# Patient Record
Sex: FEMALE | Race: WHITE | HISPANIC OR LATINO
[De-identification: names, ages, dates, MRNs, and addresses within clinical notes are randomized per-mention and may not be internally consistent; named-entity substitution may affect disease eponyms.]

---

## 2017-07-30 ENCOUNTER — FORM ENCOUNTER (OUTPATIENT)
Age: 52
End: 2017-07-30

## 2017-07-31 ENCOUNTER — OUTPATIENT (OUTPATIENT)
Dept: OUTPATIENT SERVICES | Facility: HOSPITAL | Age: 52
LOS: 1 days | End: 2017-07-31
Payer: COMMERCIAL

## 2017-07-31 ENCOUNTER — APPOINTMENT (OUTPATIENT)
Dept: ORTHOPEDIC SURGERY | Facility: CLINIC | Age: 52
End: 2017-07-31
Payer: COMMERCIAL

## 2017-07-31 DIAGNOSIS — Z86.2 PERSONAL HISTORY OF DISEASES OF THE BLOOD AND BLOOD-FORMING ORGANS AND CERTAIN DISORDERS INVOLVING THE IMMUNE MECHANISM: ICD-10-CM

## 2017-07-31 DIAGNOSIS — M25.561 PAIN IN RIGHT KNEE: ICD-10-CM

## 2017-07-31 DIAGNOSIS — M25.562 PAIN IN LEFT KNEE: ICD-10-CM

## 2017-07-31 PROCEDURE — 73630 X-RAY EXAM OF FOOT: CPT

## 2017-07-31 PROCEDURE — 73630 X-RAY EXAM OF FOOT: CPT | Mod: 26,50

## 2017-07-31 PROCEDURE — 73600 X-RAY EXAM OF ANKLE: CPT

## 2017-07-31 PROCEDURE — 73600 X-RAY EXAM OF ANKLE: CPT | Mod: 26,50

## 2017-07-31 PROCEDURE — 99203 OFFICE O/P NEW LOW 30 MIN: CPT

## 2017-09-13 ENCOUNTER — APPOINTMENT (OUTPATIENT)
Dept: ORTHOPEDIC SURGERY | Facility: CLINIC | Age: 52
End: 2017-09-13
Payer: COMMERCIAL

## 2017-09-13 DIAGNOSIS — M76.62 ACHILLES TENDINITIS, RIGHT LEG: ICD-10-CM

## 2017-09-13 DIAGNOSIS — M76.61 ACHILLES TENDINITIS, RIGHT LEG: ICD-10-CM

## 2017-09-13 PROCEDURE — 99214 OFFICE O/P EST MOD 30 MIN: CPT

## 2017-10-06 ENCOUNTER — APPOINTMENT (OUTPATIENT)
Dept: ORTHOPEDIC SURGERY | Facility: CLINIC | Age: 52
End: 2017-10-06

## 2018-03-29 ENCOUNTER — APPOINTMENT (OUTPATIENT)
Dept: ENDOCRINOLOGY | Facility: CLINIC | Age: 53
End: 2018-03-29
Payer: COMMERCIAL

## 2018-03-29 VITALS
WEIGHT: 232 LBS | DIASTOLIC BLOOD PRESSURE: 79 MMHG | SYSTOLIC BLOOD PRESSURE: 121 MMHG | HEIGHT: 64 IN | HEART RATE: 90 BPM | BODY MASS INDEX: 39.61 KG/M2

## 2018-03-29 PROCEDURE — 99205 OFFICE O/P NEW HI 60 MIN: CPT

## 2018-03-29 PROCEDURE — 97802 MEDICAL NUTRITION INDIV IN: CPT

## 2018-04-20 LAB
25(OH)D3 SERPL-MCNC: 19.5 NG/ML
ACTH-ESO: 11 PG/ML
ALBUMIN SERPL ELPH-MCNC: 4.2 G/DL
ALP BLD-CCNC: 113 U/L
ALT SERPL-CCNC: 26 U/L
ANION GAP SERPL CALC-SCNC: 13 MMOL/L
AST SERPL-CCNC: 26 U/L
BILIRUB SERPL-MCNC: 0.2 MG/DL
BUN SERPL-MCNC: 16 MG/DL
CALCIUM SERPL-MCNC: 8.9 MG/DL
CHLORIDE SERPL-SCNC: 105 MMOL/L
CHOLEST SERPL-MCNC: 204 MG/DL
CHOLEST/HDLC SERPL: 3 RATIO
CO2 SERPL-SCNC: 25 MMOL/L
CORTIS SERPL-MCNC: 3.4 UG/DL
CREAT SERPL-MCNC: 0.66 MG/DL
GLUCOSE SERPL-MCNC: 92 MG/DL
HBA1C MFR BLD HPLC: 6 %
HDLC SERPL-MCNC: 68 MG/DL
LDLC SERPL CALC-MCNC: 95 MG/DL
POTASSIUM SERPL-SCNC: 4.1 MMOL/L
PROT SERPL-MCNC: 6.9 G/DL
SODIUM SERPL-SCNC: 143 MMOL/L
TRIGL SERPL-MCNC: 205 MG/DL
TSH SERPL-ACNC: 0.95 UIU/ML

## 2018-04-23 ENCOUNTER — APPOINTMENT (OUTPATIENT)
Dept: ENDOCRINOLOGY | Facility: CLINIC | Age: 53
End: 2018-04-23
Payer: COMMERCIAL

## 2018-04-23 VITALS
SYSTOLIC BLOOD PRESSURE: 142 MMHG | BODY MASS INDEX: 39.09 KG/M2 | HEART RATE: 85 BPM | DIASTOLIC BLOOD PRESSURE: 87 MMHG | WEIGHT: 229 LBS | HEIGHT: 64 IN

## 2018-04-23 DIAGNOSIS — E55.9 VITAMIN D DEFICIENCY, UNSPECIFIED: ICD-10-CM

## 2018-04-23 PROCEDURE — 99214 OFFICE O/P EST MOD 30 MIN: CPT

## 2018-04-25 ENCOUNTER — OTHER (OUTPATIENT)
Age: 53
End: 2018-04-25

## 2018-04-27 ENCOUNTER — OTHER (OUTPATIENT)
Age: 53
End: 2018-04-27

## 2018-05-01 ENCOUNTER — OUTPATIENT (OUTPATIENT)
Dept: OUTPATIENT SERVICES | Facility: HOSPITAL | Age: 53
LOS: 1 days | End: 2018-05-01
Payer: COMMERCIAL

## 2018-05-01 PROCEDURE — 71046 X-RAY EXAM CHEST 2 VIEWS: CPT | Mod: 26

## 2018-05-01 PROCEDURE — 71046 X-RAY EXAM CHEST 2 VIEWS: CPT

## 2018-05-07 ENCOUNTER — APPOINTMENT (OUTPATIENT)
Dept: ENDOCRINOLOGY | Facility: CLINIC | Age: 53
End: 2018-05-07
Payer: COMMERCIAL

## 2018-05-07 VITALS — BODY MASS INDEX: 39.14 KG/M2 | WEIGHT: 228 LBS

## 2018-05-07 PROCEDURE — 97803 MED NUTRITION INDIV SUBSEQ: CPT

## 2018-07-23 ENCOUNTER — APPOINTMENT (OUTPATIENT)
Dept: ENDOCRINOLOGY | Facility: CLINIC | Age: 53
End: 2018-07-23
Payer: COMMERCIAL

## 2018-07-23 VITALS
SYSTOLIC BLOOD PRESSURE: 118 MMHG | HEIGHT: 64 IN | WEIGHT: 220 LBS | DIASTOLIC BLOOD PRESSURE: 78 MMHG | HEART RATE: 84 BPM | BODY MASS INDEX: 37.56 KG/M2

## 2018-07-23 PROCEDURE — 99213 OFFICE O/P EST LOW 20 MIN: CPT

## 2018-07-26 LAB
25(OH)D3 SERPL-MCNC: 30.4 NG/ML
ALBUMIN SERPL ELPH-MCNC: 4.8 G/DL
ALP BLD-CCNC: 109 U/L
ALT SERPL-CCNC: 23 U/L
ANION GAP SERPL CALC-SCNC: 17 MMOL/L
AST SERPL-CCNC: 23 U/L
BILIRUB SERPL-MCNC: 0.4 MG/DL
BUN SERPL-MCNC: 9 MG/DL
CALCIUM SERPL-MCNC: 9.1 MG/DL
CHLORIDE SERPL-SCNC: 99 MMOL/L
CHOLEST SERPL-MCNC: 188 MG/DL
CHOLEST/HDLC SERPL: 3.2 RATIO
CO2 SERPL-SCNC: 24 MMOL/L
CREAT SERPL-MCNC: 0.74 MG/DL
GLUCOSE SERPL-MCNC: 104 MG/DL
HBA1C MFR BLD HPLC: 6 %
HDLC SERPL-MCNC: 59 MG/DL
LDLC SERPL CALC-MCNC: 117 MG/DL
POTASSIUM SERPL-SCNC: 4.4 MMOL/L
PROT SERPL-MCNC: 7.4 G/DL
SODIUM SERPL-SCNC: 140 MMOL/L
TRIGL SERPL-MCNC: 58 MG/DL
TSH SERPL-ACNC: 0.75 UIU/ML

## 2018-08-08 ENCOUNTER — APPOINTMENT (OUTPATIENT)
Dept: ENDOCRINOLOGY | Facility: CLINIC | Age: 53
End: 2018-08-08

## 2019-06-28 ENCOUNTER — APPOINTMENT (OUTPATIENT)
Dept: ORTHOPEDIC SURGERY | Facility: CLINIC | Age: 54
End: 2019-06-28
Payer: COMMERCIAL

## 2019-06-28 VITALS
WEIGHT: 215 LBS | DIASTOLIC BLOOD PRESSURE: 80 MMHG | BODY MASS INDEX: 36.7 KG/M2 | HEART RATE: 83 BPM | SYSTOLIC BLOOD PRESSURE: 124 MMHG | OXYGEN SATURATION: 97 % | HEIGHT: 64 IN

## 2019-06-28 DIAGNOSIS — M17.0 BILATERAL PRIMARY OSTEOARTHRITIS OF KNEE: ICD-10-CM

## 2019-06-28 PROCEDURE — 99213 OFFICE O/P EST LOW 20 MIN: CPT

## 2019-06-28 RX ORDER — METFORMIN HYDROCHLORIDE 1000 MG/1
1000 TABLET, FILM COATED, EXTENDED RELEASE ORAL
Qty: 180 | Refills: 2 | Status: DISCONTINUED | COMMUNITY
Start: 2018-04-23 | End: 2019-06-28

## 2019-06-28 RX ORDER — ERGOCALCIFEROL 1.25 MG/1
1.25 MG CAPSULE, LIQUID FILLED ORAL
Qty: 12 | Refills: 1 | Status: DISCONTINUED | COMMUNITY
Start: 2018-04-25 | End: 2019-06-28

## 2019-06-28 RX ORDER — METFORMIN HYDROCHLORIDE 1000 MG/1
1000 TABLET, COATED ORAL
Qty: 180 | Refills: 3 | Status: DISCONTINUED | COMMUNITY
Start: 2018-04-25 | End: 2019-06-28

## 2019-06-28 RX ORDER — METFORMIN HYDROCHLORIDE 1000 MG/1
1000 TABLET, COATED ORAL
Qty: 60 | Refills: 5 | Status: DISCONTINUED | COMMUNITY
Start: 2018-04-27 | End: 2019-06-28

## 2019-06-28 RX ORDER — IBUPROFEN 800 MG/1
TABLET, FILM COATED ORAL
Refills: 0 | Status: DISCONTINUED | COMMUNITY
End: 2019-06-28

## 2019-06-28 RX ORDER — ERGOCALCIFEROL 1.25 MG/1
1.25 MG CAPSULE, LIQUID FILLED ORAL
Qty: 12 | Refills: 3 | Status: DISCONTINUED | COMMUNITY
Start: 2018-04-23 | End: 2019-06-28

## 2019-06-28 NOTE — HISTORY OF PRESENT ILLNESS
[Worsening] : worsening [___ yrs] : [unfilled] year(s) ago [Constant] : ~He/She~ states the symptoms seem to be constant [Bending] : worsened by bending [Walking] : worsened by walking [None] : No relieving factors are noted [de-identified] : Patient presents for evaluation of bilateral knee pain. States pain is worse after walking for prolonged periods and going up and down stairs. She reports a constant ache. She denies limping and ambulates unassisted. She is working on weight loss and is working with a . She had a steroid injection several years ago with temporary relief.  [NSAIDs] : not relieved by nonsteroidal anti-inflammatory drugs [Acetaminophen] : not relieved by acetaminophen [Physical Therapy] : not relieved by physical therapy

## 2019-06-28 NOTE — PHYSICAL EXAM
[de-identified] : Constitutional: Well appearing.  No acute distress.\par Mental Status: Alert & oriented to person, place and time. Normal affect.\par Pulmonary: No respiratory distress. Normal chest excursion.\par Abdomen: Soft and not distended.\par Gait: Normal.\par Ambulatory assist devices: None.\par \par Cervical spine: Skin intact. No visible deformity.  Painless active ROM without evident restriction.\par Bilateral upper extremities: Skin intact. No deformity. Painless active ROM without evident restriction.\par Thoracolumbar spine: No deformity. No tenderness. No radicular pain on passive straight leg raise bilaterally.\par Pelvis: No pelvic obliquity. No tenderness.\par Leg lengths: Equal.\par \par Bilateral Hips: No swelling or deformity. No focal tenderness. Painless and unrestricted range of motion.  No crepitation. Hip flexor and abductor power 5/5.\par \par Right Knee:\par Skin intact.\par No surgical scars.\par No erythema or ecchymosis.\par No swelling or effusion.\par No deformity.\par Medial > lateral joint line tenderness.\par Painless ROM from full extension to 120 degrees of flexion.\par Central patellar tracking.\par No crepitation.\par No instability.\par Quadriceps power 5/5.\par \par Left Knee:\par Skin intact.\par No surgical scars.\par No erythema or ecchymosis.\par No swelling or effusion.\par No deformity.\par Medial and lateral joint line tenderness.\par Painless ROM from full extension to 125 degrees of flexion.\par Central patellar tracking.\par No crepitation.\par No instability.\par Quadriceps power 5/5.\par \par Neurological: Intact distal crude touch sensation. Normal distal motor power. Symmetric knee jerk and ankle jerk reflexes bilaterally.\par Cardiovascular: Palpable dorsalis pedis and posterior tibialis pulses. Brisk capillary refill. No peripheral edema.\par Lymphatics:  No peripheral adenopathy appreciated.\par \par  [de-identified] : X-rays scanned into orthopacs demonstrate bilateral knees with moderate joint space narrowing and DJD

## 2019-06-28 NOTE — ADDENDUM
[FreeTextEntry1] : Dr. Pinto was physically present during the key portions the encounter, provided assistance as needed, and formulated the assessment and plan as documented above.\par \par

## 2019-06-28 NOTE — DISCUSSION/SUMMARY
[de-identified] : Diagnosis, prognosis and treatment options discussed. Conservative management including activity modification, therapeutic exercise with PT, topical and oral antiinflammatories, steroid and HA injections discussed. Patient will begin PT and may take Meloxicam prn. She may call if she wishes to pursue injections.

## 2019-07-23 ENCOUNTER — APPOINTMENT (OUTPATIENT)
Dept: ENDOCRINOLOGY | Facility: CLINIC | Age: 54
End: 2019-07-23
Payer: COMMERCIAL

## 2019-07-23 VITALS
WEIGHT: 226 LBS | BODY MASS INDEX: 38.79 KG/M2 | HEART RATE: 73 BPM | SYSTOLIC BLOOD PRESSURE: 141 MMHG | DIASTOLIC BLOOD PRESSURE: 85 MMHG

## 2019-07-23 DIAGNOSIS — R73.03 PREDIABETES.: ICD-10-CM

## 2019-07-23 PROCEDURE — 99214 OFFICE O/P EST MOD 30 MIN: CPT

## 2019-07-26 NOTE — PHYSICAL EXAM
[Alert] : alert [Normal Sclera/Conjunctiva] : normal sclera/conjunctiva [No Respiratory Distress] : no respiratory distress [Thyroid Not Enlarged] : the thyroid was not enlarged [No Thyroid Nodules] : there were no palpable thyroid nodules [Normal Rate] : heart rate was normal  [Pedal Pulses Normal] : the pedal pulses are present [Not Distended] : not distended [Normal Bowel Sounds] : normal bowel sounds [No Stigmata of Cushings Syndrome] : no stigmata of cushings syndrome [Spine Straight] : spine straight [Normal Gait] : normal gait [No Rash] : no rash [Normal Reflexes] : deep tendon reflexes were 2+ and symmetric [Oriented x3] : oriented to person, place, and time [Normal Outer Ear/Nose] : the ears and nose were normal in appearance [Clear to Auscultation] : lungs were clear to auscultation bilaterally [Normal S1, S2] : normal S1 and S2 [No Edema] : there was no peripheral edema [Acanthosis Nigricans] : no acanthosis nigricans [de-identified] : no tenderness  [de-identified] : varicose veins

## 2019-07-26 NOTE — HISTORY OF PRESENT ILLNESS
[FreeTextEntry1] : 11/2015 glucose 112\par 3/29/18: A1c 6%, Total cholesterol 204, a 25 OH vitamin D 19.5, TSH and cortisol, both normal \par 7/23/18: A1c 6.0%, TSH 0.75, s. creat 0.74, LDL-c 117, Vit D 25-OH 30.4\par \par 53 years old female pt, /85, BMI 38.79, with Hx of pre- DM and obesity.\par Other Significant PMHx: bilateral plantar fasciitis and bilateral knee arthroscopic surgery\par Other PMHx: Vit D insufficiency \par SHx: works as an independent , rare etOH use\par Physically active \par Saw PCP within the past 12 months\par \par 3/29/18\par Presents today complaining of rapid weight gain of > 20 pounds over the past 8 months, without lifestyle and diet changes. \par \par 7/23/18\par Patient is focused with her, weight management she is attending a program to lose weight, she has lost 12 pounds\par She also seen RD [facility]\par She is feeling great no complaints\par \par 7/23/19\par Today pt presents for endocrine f/u, feeling well with no major physical complaints. Pt notes she is not taking any new medications and saw her PCP within the past 12 months for a regular f/u. \par Pt gained 6lbs since her last visit on 7/23/19.\par \par Current Medications: Probiotic for the past couple of months

## 2019-07-26 NOTE — ASSESSMENT
[FreeTextEntry1] : 54 y/o F pt with:\par 1. Hx of pre-DM, with continuous A1c of 6%:\par Pt is asymptomatic, and is at increased risk to develop DM along with long term complications associated with DM.\par \par 2. Hx of obesity, BMI 38.79 along with elevation of LDL-c: \par Over the past 2 yrs, pt has been continuously gaining weight. Pt has limited mobility 2/2 lower extremities discomfort. Proposed drug therapy option to pt, which pt states she will think about. Pending lab results. Refer pt to see RD.\par  \par Return in 4 weeks.  [Importance of Diet and Exercise] : importance of diet and exercise to improve glycemic control, achieve weight loss and improve cardiovascular health

## 2019-07-26 NOTE — ADDENDUM
[FreeTextEntry1] : I, Oliveriobrook Sharpe, acted solely as a scribe for Dr. Jean Fernandez on this date. 07/23/2019.\par

## 2019-07-26 NOTE — END OF VISIT
[>50% of Time Spent on Counseling for ____] : Greater than 50% of the encounter time was spent on counseling for [unfilled] [FreeTextEntry3] : All medical record entries made by the Scribe were at my, Dr. Jean Fernandez, direction and personally dictated by me on 07/23/2019. I have reviewed the chart and agree that the record accurately reflects my personal performance of the history, physical exam, assessment and plan. I have also personally directed, reviewed and agreed with the chart.\par

## 2019-08-06 ENCOUNTER — APPOINTMENT (OUTPATIENT)
Dept: ENDOCRINOLOGY | Facility: CLINIC | Age: 54
End: 2019-08-06
Payer: COMMERCIAL

## 2019-08-06 VITALS
SYSTOLIC BLOOD PRESSURE: 132 MMHG | WEIGHT: 225 LBS | BODY MASS INDEX: 38.62 KG/M2 | HEART RATE: 73 BPM | DIASTOLIC BLOOD PRESSURE: 76 MMHG

## 2019-08-06 PROCEDURE — 82962 GLUCOSE BLOOD TEST: CPT

## 2019-08-06 PROCEDURE — 99214 OFFICE O/P EST MOD 30 MIN: CPT | Mod: 25

## 2019-08-06 NOTE — PHYSICAL EXAM
[Alert] : alert [Normal Sclera/Conjunctiva] : normal sclera/conjunctiva [Normal Outer Ear/Nose] : the ears and nose were normal in appearance [Normal Rate] : heart rate was normal  [Normal S1, S2] : normal S1 and S2 [No Edema] : there was no peripheral edema [Normal Bowel Sounds] : normal bowel sounds [Not Distended] : not distended [Spine Straight] : spine straight [No Stigmata of Cushings Syndrome] : no stigmata of cushings syndrome [Normal Gait] : normal gait [No Rash] : no rash [Normal Reflexes] : deep tendon reflexes were 2+ and symmetric [Oriented x3] : oriented to person, place, and time [No Neck Mass] : no neck mass was observed [Normal Rate and Effort] : normal respiratory rhythm and effort

## 2019-08-07 LAB
25(OH)D3 SERPL-MCNC: 21.9 NG/ML
ALBUMIN SERPL ELPH-MCNC: 4.5 G/DL
ALP BLD-CCNC: 110 U/L
ALT SERPL-CCNC: 20 U/L
ANION GAP SERPL CALC-SCNC: 11 MMOL/L
AST SERPL-CCNC: 25 U/L
BILIRUB SERPL-MCNC: 0.3 MG/DL
BUN SERPL-MCNC: 12 MG/DL
CALCIUM SERPL-MCNC: 9.5 MG/DL
CHLORIDE SERPL-SCNC: 106 MMOL/L
CHOLEST SERPL-MCNC: 198 MG/DL
CHOLEST/HDLC SERPL: 3.2 RATIO
CO2 SERPL-SCNC: 27 MMOL/L
CREAT SERPL-MCNC: 0.71 MG/DL
CREAT SPEC-SCNC: 196 MG/DL
ESTIMATED AVERAGE GLUCOSE: 126 MG/DL
GLUCOSE BLDC GLUCOMTR-MCNC: 96
GLUCOSE SERPL-MCNC: 79 MG/DL
HBA1C MFR BLD HPLC: 6 %
HDLC SERPL-MCNC: 62 MG/DL
LDLC SERPL CALC-MCNC: 110 MG/DL
MICROALBUMIN 24H UR DL<=1MG/L-MCNC: 1.3 MG/DL
MICROALBUMIN/CREAT 24H UR-RTO: 7 MG/G
POTASSIUM SERPL-SCNC: 4.1 MMOL/L
PROT SERPL-MCNC: 7.1 G/DL
SODIUM SERPL-SCNC: 144 MMOL/L
TRIGL SERPL-MCNC: 132 MG/DL
TSH SERPL-ACNC: 1.04 UIU/ML

## 2019-08-09 NOTE — HISTORY OF PRESENT ILLNESS
[FreeTextEntry1] : 11/2015 glucose 112\par 3/29/18: A1c 6%, Total cholesterol 204, a 25 OH vitamin D 19.5, TSH and cortisol, both normal \par 7/23/18: A1c 6.0%, TSH 0.75, s. creat 0.74, LDL-c 117, Vit D 25-OH 30.4\par \par 53 years old female pt, /76, BMI 38.62, with Hx of pre- DM and obesity.\par Other Significant PMHx: bilateral plantar fasciitis and bilateral knee arthroscopic surgery\par Other PMHx: Vit D insufficiency \par SHx: works as an independent , rare etOH use\par Physically active \par Saw PCP within the past 12 months\par \par 3/29/18\par Presents today complaining of rapid weight gain of > 20 pounds over the past 8 months, without lifestyle and diet changes. \par \par 7/23/18\par Patient is focused with her, weight management she is attending a program to lose weight, she has lost 12 pounds\par She also seen RD at [facility]\par She is feeling great no complaints\par \par 7/23/19\par Today pt presents for endocrine f/u, feeling well with no major physical complaints. Pt notes she is not taking any new medications and saw her PCP within the past 12 months for a regular f/u. \par Pt gained 6lbs since her last visit on 7/23/19.\par \par 8/6/19\par Today pt presents for endocrine f/u, feeling well with no major physical complaints. Pt states she is not taking any new medication, just probiotics.\par \par Current Medications: Probiotic

## 2019-08-09 NOTE — ADDENDUM
[FreeTextEntry1] : I, Adriana Sharpe, acted solely as a scribe for Dr. Jean Fernandez on this date. 08/06/2019.\par I, Arvin Coleman, acted solely as a scribe for Dr. Jean Fernandez on this date. 08/06/2019.

## 2019-08-09 NOTE — END OF VISIT
[>50% of Time Spent on Counseling for ____] : Greater than 50% of the encounter time was spent on counseling for [unfilled] [Time Spent: ___ minutes] : I have spent [unfilled] minutes of face to face time with the patient [FreeTextEntry3] : All medical record entries made by the Scribe were at my, Dr. Jean Fernandez, direction and personally dictated by me on 08/06/2019. I have reviewed the chart and agree that the record accurately reflects my personal performance of the history, physical exam, assessment and plan. I have also personally directed, reviewed and agreed with the chart.\par

## 2019-08-13 ENCOUNTER — APPOINTMENT (OUTPATIENT)
Dept: ENDOCRINOLOGY | Facility: CLINIC | Age: 54
End: 2019-08-13

## 2019-08-22 ENCOUNTER — OTHER (OUTPATIENT)
Age: 54
End: 2019-08-22

## 2019-09-23 ENCOUNTER — OTHER (OUTPATIENT)
Age: 54
End: 2019-09-23

## 2019-11-05 ENCOUNTER — APPOINTMENT (OUTPATIENT)
Dept: ENDOCRINOLOGY | Facility: CLINIC | Age: 54
End: 2019-11-05

## 2020-02-26 ENCOUNTER — APPOINTMENT (OUTPATIENT)
Dept: PULMONOLOGY | Facility: CLINIC | Age: 55
End: 2020-02-26
Payer: COMMERCIAL

## 2020-02-26 VITALS
SYSTOLIC BLOOD PRESSURE: 130 MMHG | HEIGHT: 64 IN | OXYGEN SATURATION: 98 % | BODY MASS INDEX: 38.41 KG/M2 | DIASTOLIC BLOOD PRESSURE: 70 MMHG | WEIGHT: 225 LBS | HEART RATE: 87 BPM | TEMPERATURE: 98.7 F

## 2020-02-26 PROCEDURE — ZZZZZ: CPT

## 2020-02-26 PROCEDURE — 99204 OFFICE O/P NEW MOD 45 MIN: CPT | Mod: 25

## 2020-02-26 PROCEDURE — 94729 DIFFUSING CAPACITY: CPT

## 2020-02-26 PROCEDURE — 94727 GAS DIL/WSHOT DETER LNG VOL: CPT

## 2020-02-26 PROCEDURE — 94060 EVALUATION OF WHEEZING: CPT

## 2020-02-26 NOTE — PHYSICAL EXAM
[No Acute Distress] : no acute distress [Normal Appearance] : normal appearance [No Neck Mass] : no neck mass [Normal Oropharynx] : normal oropharynx [Normal S1, S2] : normal s1, s2 [Normal Rate/Rhythm] : normal rate/rhythm [No Resp Distress] : no resp distress [No Murmurs] : no murmurs [Normal Gait] : normal gait [No Abnormalities] : no abnormalities [Benign] : benign [No Clubbing] : no clubbing [No Cyanosis] : no cyanosis [No Edema] : no edema [FROM] : FROM [No Focal Deficits] : no focal deficits [Normal Color/ Pigmentation] : normal color/ pigmentation [Oriented x3] : oriented x3 [Normal Affect] : normal affect [TextBox_68] : few coarse rhonchi in RUL

## 2020-02-26 NOTE — REVIEW OF SYSTEMS
[Nasal Congestion] : nasal congestion [Postnasal Drip] : postnasal drip [Sinus Problems] : sinus problems [Cough] : cough [Sputum] : sputum [Negative] : Endocrine [Dyspnea] : no dyspnea [SOB on Exertion] : no sob on exertion

## 2020-02-26 NOTE — PROCEDURE
[FreeTextEntry1] : PFT normal pirometry, no BD response, normal lung volumes and reduced diffusion to 60% predicted. Pt reports no sob but a chronic cough. Suspect strong upper airway component, will send to ENT for eval. May need MCT.Pt reports clear CXR and may consider more imaging in the future. Asked to obtain records.

## 2020-02-26 NOTE — HISTORY OF PRESENT ILLNESS
[TextBox_4] : 54F here to establish care. She has had a chronic cough for years and had been under care of ENT, allergist and pulmonologist until her insurance changed. Sge describes chronic productive cough, mostly white phlegm, worse at night. She ha s a h/o GERD and sinusitis with postnasal drip. Was given flovent and uses prn only. ET is good, no dyspnea only cough. Has gained some weight also. Nonsmoker. NO occupational exposures. No h/o asthma personal or in family. No dogs or pets.

## 2020-03-13 ENCOUNTER — APPOINTMENT (OUTPATIENT)
Dept: OTOLARYNGOLOGY | Facility: CLINIC | Age: 55
End: 2020-03-13
Payer: COMMERCIAL

## 2020-03-13 VITALS
OXYGEN SATURATION: 97 % | BODY MASS INDEX: 40.12 KG/M2 | WEIGHT: 235 LBS | DIASTOLIC BLOOD PRESSURE: 90 MMHG | HEIGHT: 64 IN | HEART RATE: 88 BPM | SYSTOLIC BLOOD PRESSURE: 150 MMHG

## 2020-03-13 DIAGNOSIS — R09.82 POSTNASAL DRIP: ICD-10-CM

## 2020-03-13 PROCEDURE — 99205 OFFICE O/P NEW HI 60 MIN: CPT | Mod: 25

## 2020-03-13 PROCEDURE — 31579 LARYNGOSCOPY TELESCOPIC: CPT

## 2020-03-13 NOTE — REVIEW OF SYSTEMS
[Patient Intake Form Reviewed] : Patient intake form was reviewed [Ear Pain] : ear pain [As Noted in HPI] : as noted in HPI [Negative] : Constitutional [de-identified] : R ear pain [FreeTextEntry1] : all other ROS negative

## 2020-03-13 NOTE — ASSESSMENT
[FreeTextEntry1] : 54F who presents with multiple ENT issues.  In terms of chronic cough, this appears to be consistent with LPR.  I am recommending dietary and behavioral modification as well as omeprazole in the AM and famotidine in the PM.  We also discussed voice hygiene.  Informational sheet given to patient.  In terms of otalgia, this appears to be more consistent with TMJ, informational sheet for conservative management. In terms of congestion, she can use nasal saline rinses bid.  Follow up 3 mo, sooner should symptoms worsen or fail to improve.\par \par Plan:\par - continue omeprazole AM\par - Rx famotidine PM\par - dietary and behavioral modification \par - voice hygiene\par - TMJ soft diet\par - dental referral for mouth guard

## 2020-03-13 NOTE — PROCEDURE
[de-identified] : Procedure Note\par \par Pre-operative Diagnosis: chronic cough, r/o reflux laryngitis\par Post-operative Diagnosis: reflux laryngitis\par Anesthesia: Topical - 1 % Lidocaine/Phenylephrine\par Procedure: Flexible Laryngoscopy with Stroboscopy\par \par Procedure Details: \par The patient was placed in the sitting position. After decongestant and anesthesia were applied the laryngoscope was passed. The nasal cavities, nasopharynx, oropharynx, hypopharynx, and larynx were all examined. Vocal folds were examined during respiration and phonation. The following findings were noted:\par \par Findings: \par Nose: Septum is midline, turbinates are normal, nasal airways patent, mucosa normal\par Nasopharynx: Adenoids are absent with scarring, no masses, eustachian tube normal\par Oropharynx: Pharyngeal walls symmetric and without lesion. Tonsils/fossae symmetric\par Hypopharynx: Hypopharynx and pyriform sinuses without lesion. No masses or asymmetry. No pooling of secretions.\par Larynx: Epiglottis and aryepiglottic folds were sharp and crisp bilaterally. Bilateral false vocal folds normal appearance. Airway was widely patent.\par \par Condition: Stable. Patient tolerated procedure well.\par \par Complications: None\par \par

## 2020-03-13 NOTE — PHYSICAL EXAM
[Midline] : trachea located in midline position [Normal] : no rashes [Laryngoscopy Performed] : laryngoscopy was performed, see procedure section for findings [FreeTextEntry1] : hoarse [de-identified] : thick neck with increased circumference [de-identified] : + crepitus on right [de-identified] : excoriation of l EAC

## 2020-03-13 NOTE — REASON FOR VISIT
[Initial Evaluation] : an initial evaluation for [FreeTextEntry2] : post nasal drip and chronic cough

## 2020-03-13 NOTE — HISTORY OF PRESENT ILLNESS
[de-identified] : 54F who presents with chronic post nasal drip and cough.  Pt notes that she has had a dry cough for over 20 years.  She notes that she feels an irritation in her throat and a sensation that she needs to clear her throat.  She associates this with a burning sensation.  Sometimes it is a throat clear and sometimes a "coughing fit."  This occurs 3-4 times daily.  It is dry in nature.  There are no temporal factors.  She feels that post nasal drip as played a role in the past.  She was seen by Dr. Mckeon for cough and breathing difficulty.  Notes indicate normal PFTs.  \par \par In terms of throat symptoms she does have worsening dysphonia over the past several months.  Her voice is hoarse and gravely.  She denies any dysphagia of solids or liquids, odynophagia. She admits to drinking green tea, eating tomato based products, citrus and blueberries weekly, but denies frequent alcohol consumption, coffee or frequent chocolate.\par \par Patient reports she has a history of sinusitis s/p FESS in 1995, but since has had continued nasal congestion, nasal drainage, facial pressure w/ headaches. She denies anosmia or h/o allergies. \par \par Patient also admits to right ear pain that has been there for 5+ years. She has been prescribed different types of drops for it without any relief. She denies any otorrhea, hearing changes tinnitus, current dizziness (has had vertigo in the past). \par \par No other ENT complaints. No pertinent SH/FH.

## 2020-06-12 ENCOUNTER — APPOINTMENT (OUTPATIENT)
Dept: OTOLARYNGOLOGY | Facility: CLINIC | Age: 55
End: 2020-06-12
Payer: COMMERCIAL

## 2020-06-12 VITALS
SYSTOLIC BLOOD PRESSURE: 113 MMHG | WEIGHT: 234 LBS | HEART RATE: 87 BPM | HEIGHT: 64 IN | DIASTOLIC BLOOD PRESSURE: 79 MMHG | BODY MASS INDEX: 39.95 KG/M2

## 2020-06-12 DIAGNOSIS — H61.22 IMPACTED CERUMEN, LEFT EAR: ICD-10-CM

## 2020-06-12 PROCEDURE — 69210 REMOVE IMPACTED EAR WAX UNI: CPT

## 2020-06-12 PROCEDURE — 99215 OFFICE O/P EST HI 40 MIN: CPT | Mod: 25

## 2020-06-12 PROCEDURE — 31579 LARYNGOSCOPY TELESCOPIC: CPT

## 2020-06-12 NOTE — PHYSICAL EXAM
[Midline] : trachea located in midline position [Laryngoscopy Performed] : laryngoscopy was performed, see procedure section for findings [Normal] : no rashes [de-identified] : L EAC with cerumen impacted, removed [FreeTextEntry1] : improved, normal voice

## 2020-06-12 NOTE — HISTORY OF PRESENT ILLNESS
[de-identified] : 54F who presents with chronic post nasal drip and cough.  Pt notes that she has had a dry cough for over 20 years.  She notes that she feels an irritation in her throat and a sensation that she needs to clear her throat.  She associates this with a burning sensation.  Sometimes it is a throat clear and sometimes a "coughing fit."  This occurs 3-4 times daily.  It is dry in nature.  There are no temporal factors.  She feels that post nasal drip as played a role in the past.  She was seen by Dr. Mckeon for cough and breathing difficulty.  Notes indicate normal PFTs.  \par \par In terms of throat symptoms she does have worsening dysphonia over the past several months.  Her voice is hoarse and gravely.  She denies any dysphagia of solids or liquids, odynophagia. She admits to drinking green tea, eating tomato based products, citrus and blueberries weekly, but denies frequent alcohol consumption, coffee or frequent chocolate.\par \par Patient reports she has a history of sinusitis s/p FESS in 1995, but since has had continued nasal congestion, nasal drainage, facial pressure w/ headaches. She denies anosmia or h/o allergies. \par \par Patient also admits to right ear pain that has been there for 5+ years. She has been prescribed different types of drops for it without any relief. She denies any otorrhea, hearing changes tinnitus, current dizziness (has had vertigo in the past). \par \par No other ENT complaints. No pertinent SH/FH.\par - [FreeTextEntry1] : 6/12/20-\par Since the last visit the patient reports she has been strictly adhering to the diet modifications, but admits has not been taking the famotidine as she is trying not to avoid taking medications. She admits to improve in terms of her voice, but continued symptoms of, cough, throat clearing, otalgia.  She believes that her nasal symptoms have cleared up.\par \par Additionally today we did discuss her sleep and noted that she has a hx of PITER, but does not use CPAP.  Can feel tired throughout the day.  Wakes up in the middle of the night frequently.

## 2020-06-12 NOTE — PROCEDURE
[de-identified] : Procedure Note\par \par Pre-operative Diagnosis: chronic cough, throat discomfort\par Post-operative Diagnosis: LPR, pharyngeal fullness and weakness \par Anesthesia: Topical - 1 % Lidocaine/Phenylephrine\par Procedure: Flexible Laryngoscopy with Stroboscopy\par \par Procedure Details: \par The patient was placed in the sitting position. After decongestant and anesthesia were applied the laryngoscope was passed. The nasal cavities, nasopharynx, oropharynx, hypopharynx, and larynx were all examined. Vocal folds were examined during respiration and phonation. The following findings were noted:\par \par Findings: \par Nose: Septum is midline, turbinates are normal, nasal airways patent, mucosa normal\par Nasopharynx: Adenoids normal, no masses, eustachian tube normal\par Oropharynx: Pharyngeal walls symmetric and without lesion. Tonsils/fossae symmetric\par Hypopharynx: Hypopharynx and pyriform sinuses without lesion. No masses or asymmetry. No pooling of secretions, crowding of the oropharynx and hypopharynx.\par Larynx: Epiglottis and aryepiglottic folds were sharp and crisp bilaterally. Bilateral false vocal folds normal appearance. Airway was widely patent. + erythema and edema of vocal process and post cricoid region\par \par Strobe Exam Ratings\par 		\par TVF Appearance: +erythema and edema of vocal process and post cricoid region.\par TVF Mobility: normal\par Edema/hypertrophy: +erythema and edema of vocal process and post cricoid region.\par Mucus on TVF: +\par Glottic Closure: adequate\par Mucosal Wave: normal\par Amplitude of Vibration: normal\par Phase: symmetric\par Supraglottic Hyperfunction: minimal\par Other Findings: none\par \par Condition: Stable. Patient tolerated procedure well.\par \par Complications: None\par \par  [FreeTextEntry1] : Cerumen Removal/Ear Cleaning for Otitis Externa\par Pre-operative Diagnosis: left Cerumen Impaction\par Post-operative Diagnosis: Same\par Procedure:  Binocular microscopy with cerumen removal- [id number]\par Procedure Details:  \par The patient was placed in the supine position.  The operating microscope was positioned.  I then placed the ear speculum in the EAC.  Cerumen was then removed using a mixture of otologic curettes, and suction.  The TM was noted to be intact. The patient tolerated procedure well.\par \par Findings: \par Bilateral Ear Canal - normal\par Bilateral Tympanic Membrane - normal\par \par Recommendations: Debrox\par Complications: None\par \par

## 2020-06-12 NOTE — REASON FOR VISIT
[Subsequent Evaluation] : a subsequent evaluation for [FreeTextEntry2] : persistent cough, and otalgia

## 2020-06-12 NOTE — REVIEW OF SYSTEMS
[As Noted in HPI] : as noted in HPI [Negative] : Head and Neck [FreeTextEntry1] : all other ROS negative

## 2020-06-12 NOTE — ASSESSMENT
[FreeTextEntry1] : 54F who presents with multiple ENT issues. Nasal issues have improved to baseline, and voice issues have improved to baseline. \par \par In terms of chronic cough, this has continued and I still believe that this is related to LPR/globus and possible sensitization. I am recommending dietary and behavioral modification as well as omeprazole in the AM and famotidine in the PM. At this point i think it would be beneficial to bring GI on board and plan for pH probe testing.  We also discussed voice hygiene, cough avoidance and sips of water throught the day. Informational sheet given to patient. \par \par Today we also discussed the patients neck fullness on exam and endoscopy as well as PITER.  I think she would benefit from sleep consultation and CPAP.  We discussed the need for weight loss as her neck circumference can contribute to both PITER and LPR. \par \par In terms of otalgia, physical exam findings are negative.  At this point i would recommend formal audio/tymp to rule out ETD, but could be TMJ especially given hx of PITER.\par \par Plan:\par - GI referral for pH testing\par - famotidine and omeprazole and well as dietary and behavioral changes.  recommended two books to help with this.\par - sleep medicine consultation and polysomnogram.\par - voice hygiene cough avoidance\par - audio/tymp\par - f/u in 1-2 months

## 2020-06-19 ENCOUNTER — APPOINTMENT (OUTPATIENT)
Dept: OTOLARYNGOLOGY | Facility: CLINIC | Age: 55
End: 2020-06-19
Payer: COMMERCIAL

## 2020-06-19 PROCEDURE — 92557 COMPREHENSIVE HEARING TEST: CPT

## 2020-06-19 PROCEDURE — 92550 TYMPANOMETRY & REFLEX THRESH: CPT

## 2020-06-19 PROCEDURE — 92588 EVOKED AUDITORY TST COMPLETE: CPT

## 2020-07-07 ENCOUNTER — APPOINTMENT (OUTPATIENT)
Dept: GASTROENTEROLOGY | Facility: CLINIC | Age: 55
End: 2020-07-07

## 2020-07-10 ENCOUNTER — OUTPATIENT (OUTPATIENT)
Dept: OUTPATIENT SERVICES | Facility: HOSPITAL | Age: 55
LOS: 1 days | End: 2020-07-10
Payer: COMMERCIAL

## 2020-07-10 ENCOUNTER — APPOINTMENT (OUTPATIENT)
Dept: SLEEP CENTER | Facility: HOME HEALTH | Age: 55
End: 2020-07-10
Payer: COMMERCIAL

## 2020-07-10 DIAGNOSIS — G47.33 OBSTRUCTIVE SLEEP APNEA (ADULT) (PEDIATRIC): ICD-10-CM

## 2020-07-10 PROCEDURE — 95800 SLP STDY UNATTENDED: CPT | Mod: 26

## 2020-07-10 PROCEDURE — 95800 SLP STDY UNATTENDED: CPT

## 2020-07-17 ENCOUNTER — APPOINTMENT (OUTPATIENT)
Dept: OTOLARYNGOLOGY | Facility: CLINIC | Age: 55
End: 2020-07-17
Payer: COMMERCIAL

## 2020-07-17 VITALS — HEIGHT: 64 IN | BODY MASS INDEX: 39.95 KG/M2 | WEIGHT: 234 LBS

## 2020-07-17 PROCEDURE — 31579 LARYNGOSCOPY TELESCOPIC: CPT

## 2020-07-17 PROCEDURE — 99214 OFFICE O/P EST MOD 30 MIN: CPT | Mod: 25

## 2020-07-17 NOTE — PHYSICAL EXAM
[Normal] : parotid gland, submandibular gland and thyroid glands are normal [de-identified] : thick neck with increased circumference [FreeTextEntry1] : improved, normal voice [de-identified] : + crepitus on right

## 2020-07-17 NOTE — HISTORY OF PRESENT ILLNESS
[de-identified] : 54F who presents with chronic post nasal drip and cough.  Pt notes that she has had a dry cough for over 20 years.  She notes that she feels an irritation in her throat and a sensation that she needs to clear her throat.  She associates this with a burning sensation.  Sometimes it is a throat clear and sometimes a "coughing fit."  This occurs 3-4 times daily.  It is dry in nature.  There are no temporal factors.  She feels that post nasal drip as played a role in the past.  She was seen by Dr. Mckeon for cough and breathing difficulty.  Notes indicate normal PFTs.  \par \par In terms of throat symptoms she does have worsening dysphonia over the past several months.  Her voice is hoarse and gravely.  She denies any dysphagia of solids or liquids, odynophagia. She admits to drinking green tea, eating tomato based products, citrus and blueberries weekly, but denies frequent alcohol consumption, coffee or frequent chocolate.\par \par Patient reports she has a history of sinusitis s/p FESS in 1995, but since has had continued nasal congestion, nasal drainage, facial pressure w/ headaches. She denies anosmia or h/o allergies. \par \par Patient also admits to right ear pain that has been there for 5+ years. She has been prescribed different types of drops for it without any relief. She denies any otorrhea, hearing changes tinnitus, current dizziness (has had vertigo in the past). \par \par No other ENT complaints. No pertinent SH/FH.\par -\par 6/12/20-\par Since the last visit the patient reports she has been strictly adhering to the diet modifications, but admits has not been taking the famotidine as she is trying not to avoid taking medications. She admits to improve in terms of her voice, but continued symptoms of, cough, throat clearing, otalgia.  She believes that her nasal symptoms have cleared up.\par \par Additionally today we did discuss her sleep and noted that she has a hx of PITER, but does not use CPAP.  Can feel tired throughout the day.  Wakes up in the middle of the night frequently.\par - [FreeTextEntry1] : -\par Update 7/17/20\par Overall symptoms have remained unchanged.  Since the last visit she has not been taking the anti reflux medications.  She has not been fully following the low acid diet.  She states that she is changing to a plant based diet in the upcoming weeks.  She perceived some continued dysphonia and says people think she sounds manly on the phone.\par \par She did have an audio/tymp. which showed normal hearing bilaterally and type A tymp bilaterally.  Please see medical record for full report.  Additionally she did have a polysomnogram and this was consistent with Severe PITER.  She will be following up with sleep medicine for CPAP.

## 2020-07-17 NOTE — ASSESSMENT
[FreeTextEntry1] : 54F who presents with multiple ENT issues.  Hx and exam are consistent with LPR, severe PITER, and TMJ.  These three things are very much related. \par \par In terms of chronic cough, this has continued and I still believe that this is related to LPR/globus and possible sensitization. I am recommending dietary and behavioral modification as well as omeprazole in the AM and famotidine in the PM. She is more open to trying at this point.  She is seeing GI and has an endoscopy planned this month.  We also discussed voice hygiene, cough avoidance and sips of water throught the day. Informational sheet given to patient. Nasal issues have improved to baseline, and voice issues have improved to baseline. \par \par Today we also discussed the patients neck fullness on exam and endoscopy as well as dx of severe PITER.  I think she would benefit from sleep consultation and CPAP.  We again discussed the need for weight loss as her neck circumference can contribute to both PTIER and LPR. \par \par In terms of otalgia, physical exam findings are negative.  Audio and tymp are normal.  Pt to follow up with dentistry for TMJ evaluation and management.\par \par Plan:\par - GI referral for pH testing, seeing this month.\par - famotidine and omeprazole and well as dietary and behavioral changes.  again recommended two books to help with this.\par - sleep medicine consultation and CPAP\par - voice hygiene cough avoidance\par - Dentistry for TMJ\par - f/u in 3 months

## 2020-09-03 ENCOUNTER — APPOINTMENT (OUTPATIENT)
Dept: PULMONOLOGY | Facility: CLINIC | Age: 55
End: 2020-09-03

## 2020-10-23 ENCOUNTER — APPOINTMENT (OUTPATIENT)
Dept: OTOLARYNGOLOGY | Facility: CLINIC | Age: 55
End: 2020-10-23
Payer: COMMERCIAL

## 2020-10-23 VITALS
WEIGHT: 234 LBS | HEIGHT: 64 IN | OXYGEN SATURATION: 96 % | SYSTOLIC BLOOD PRESSURE: 117 MMHG | HEART RATE: 73 BPM | BODY MASS INDEX: 39.95 KG/M2 | DIASTOLIC BLOOD PRESSURE: 74 MMHG

## 2020-10-23 DIAGNOSIS — J04.0 ACUTE LARYNGITIS: ICD-10-CM

## 2020-10-23 DIAGNOSIS — K21.9 ACUTE LARYNGITIS: ICD-10-CM

## 2020-10-23 PROCEDURE — 99215 OFFICE O/P EST HI 40 MIN: CPT | Mod: 25

## 2020-10-23 PROCEDURE — 99072 ADDL SUPL MATRL&STAF TM PHE: CPT

## 2020-10-23 PROCEDURE — 31575 DIAGNOSTIC LARYNGOSCOPY: CPT

## 2020-10-23 NOTE — PROCEDURE
[de-identified] : Flexible Laryngoscopy - Procedure Note\par \par Pre-operative Diagnosis: chronic cough and throat burning\par Post-operative Diagnosis: allergic mucosal changes, LPR, pharyngeal fullness\par Anesthesia: Topical - 1 % Lidocaine/Phenylephrine\par Procedure: Flexible Laryngoscopy\par \par Procedure Details: \par The patient was placed in the sitting position. After decongestant and anesthesia were applied the laryngoscope was passed. The nasal cavities, nasopharynx, oropharynx, hypopharynx, and larynx were all examined. Vocal folds were examined during respiration and phonation. The following findings were noted:\par \par Findings: \par Nose: Septum is midline, turbinates are normal, nasal airways patent, mucosa normal\par Nasopharynx: Adenoids normal, no masses, eustachian tube normal\par Oropharynx: Pharyngeal walls symmetric and without lesion. Tonsils/fossae symmetric\par Hypopharynx: Hypopharynx and pyriform sinuses without lesion. No masses or asymmetry. No pooling of secretions.\par Larynx: Epiglottis and aryepiglottic folds were sharp and crisp bilaterally. Bilateral false and true vocal folds normal appearance. Bilateral vocal folds fully mobile and symmetric. Airway was widely patent. + erythema and edema of vocal process and post cricoid region\par \par Condition: Stable. Patient tolerated procedure well.\par \par Complications: None\par \par

## 2020-10-23 NOTE — HISTORY OF PRESENT ILLNESS
[de-identified] : 54F who presents with chronic post nasal drip and cough. Pt notes that she has had a dry cough for over 20 years. She notes that she feels an irritation in her throat and a sensation that she needs to clear her throat. She associates this with a burning sensation. Sometimes it is a throat clear and sometimes a "coughing fit." This occurs 3-4 times daily. It is dry in nature. There are no temporal factors. She feels that post nasal drip as played a role in the past. She was seen by Dr. Mckeon for cough and breathing difficulty. Notes indicate normal PFTs. \par \par In terms of throat symptoms she does have worsening dysphonia over the past several months. Her voice is hoarse and gravely. She denies any dysphagia of solids or liquids, odynophagia. She admits to drinking green tea, eating tomato based products, citrus and blueberries weekly, but denies frequent alcohol consumption, coffee or frequent chocolate.\par \par Patient reports she has a history of sinusitis s/p FESS in 1995, but since has had continued nasal congestion, nasal drainage, facial pressure w/ headaches. She denies anosmia or h/o allergies. \par \par Patient also admits to right ear pain that has been there for 5+ years. She has been prescribed different types of drops for it without any relief. She denies any otorrhea, hearing changes tinnitus, current dizziness (has had vertigo in the past). \par \par No other ENT complaints. No pertinent SH/FH.\par -\par 6/12/20-\par Since the last visit the patient reports she has been strictly adhering to the diet modifications, but admits has not been taking the famotidine as she is trying not to avoid taking medications. She admits to improve in terms of her voice, but continued symptoms of, cough, throat clearing, otalgia. She believes that her nasal symptoms have cleared up.\par \par Additionally today we did discuss her sleep and noted that she has a hx of PITER, but does not use CPAP. Can feel tired throughout the day. Wakes up in the middle of the night frequently.\par - \par Interval History: -\par Update 7/17/20\par Overall symptoms have remained unchanged. Since the last visit she has not been taking the anti reflux medications. She has not been fully following the low acid diet. She states that she is changing to a plant based diet in the upcoming weeks. She perceived some continued dysphonia and says people think she sounds manly on the phone.\par \par She did have an audio/tymp. which showed normal hearing bilaterally and type A tymp bilaterally. Please see medical record for full report. Additionally she did have a polysomnogram and this was consistent with Severe PITER. She will be following up with sleep medicine for CPAP. \par - [FreeTextEntry1] : Update 10/23/20\par Patient is stable overall. She reports mild improvement of her chronic cough, but denies any changes to voice, burning sensation of the throat and stomach. Overall 20% improvement in symptoms.  She has improved her diet slightly, and she has been intermittent with the anti-reflux medications.  She did undergo endoscopy with GI and he agree with reflux changes.  She has not follow up with Sleep medicine yet nor started CPAP.  She has not followed up with dentistry regrading TMJ

## 2020-10-23 NOTE — ASSESSMENT
[FreeTextEntry1] : 54F who presents with multiple ENT issues. Hx and exam are consistent with LPR, severe PITER, and TMJ. These three things are very much related.   In terms of chronic cough, this has continued and I still believe that this is related to LPR/globus and possible sensitization. I am recommending dietary and behavioral modification as well as omeprazole in the AM and famotidine in the PM. She is more open to trying to be more regimented regarding this.\par \par We also discussed voice hygiene, cough avoidance and sips of water throught the day. Informational sheet given to patient.  If dyphonia with deep voice persists we can consider consultation with speech pathology. \par \par In terms of PITER she will be following up with sleep medicine and she will start CPAP soon. We again discussed the need for weight loss as her neck circumference can contribute to both PITER and LPR. \par \par   In terms of otalgia, physical exam findings are negative. Audio and tymp are normal. Pt to follow up with dentistry for TMJ evaluation and management.\par \par - dietary and behavioral change to reduce acid reflux\par - omeprazole and famotidine\par - sleep medicine to start CPAP for severe PITER\par - weight loss\par - dentistry follow up for TMJ

## 2020-10-23 NOTE — REASON FOR VISIT
[Subsequent Evaluation] : a subsequent evaluation for [FreeTextEntry2] : chronic cough and globus sensation

## 2020-10-28 ENCOUNTER — APPOINTMENT (OUTPATIENT)
Dept: PULMONOLOGY | Facility: CLINIC | Age: 55
End: 2020-10-28
Payer: COMMERCIAL

## 2020-10-28 VITALS
HEIGHT: 64 IN | WEIGHT: 237 LBS | TEMPERATURE: 97.2 F | RESPIRATION RATE: 12 BRPM | SYSTOLIC BLOOD PRESSURE: 120 MMHG | HEART RATE: 76 BPM | OXYGEN SATURATION: 97 % | DIASTOLIC BLOOD PRESSURE: 84 MMHG | BODY MASS INDEX: 40.46 KG/M2

## 2020-10-28 DIAGNOSIS — Z82.49 FAMILY HISTORY OF ISCHEMIC HEART DISEASE AND OTHER DISEASES OF THE CIRCULATORY SYSTEM: ICD-10-CM

## 2020-10-28 DIAGNOSIS — R06.81 APNEA, NOT ELSEWHERE CLASSIFIED: ICD-10-CM

## 2020-10-28 PROCEDURE — 99072 ADDL SUPL MATRL&STAF TM PHE: CPT

## 2020-10-28 PROCEDURE — 99214 OFFICE O/P EST MOD 30 MIN: CPT | Mod: 25

## 2020-10-28 NOTE — HISTORY OF PRESENT ILLNESS
[FreeTextEntry1] : 10/28/2020 :  MADDY STARR is a 54 year  female with PMHx GERD, who is referred  by Dr. Nelson. She c/o snoring, witnessed apnea, morning HA, and being tired during the day.\par \par She underwent unattended home study in 7/2020 which showed: severe PITER with AHI 36.6 with chadwick oxygen saturation of 74%. Less than 3% of the study time spend below an oxygen saturation of 88%.\par \par Sleep Routine:\par \par She goes to bed at 12A, sleep latency is about 10-15 min, she wakes up 2-4x, WASO is 30 min, and then she is up at 8A. She does not nap . Her ESS is 124.\par \par She denies cataplexy, RLS, parasomnia, or any other sleep behavioral issues\par \par \par

## 2020-10-28 NOTE — PHYSICAL EXAM
[General Appearance - In No Acute Distress] : no acute distress [Normal Oropharynx] : normal oropharynx [III] : III [Apical Impulse] : the apical impulse was normal [Heart Sounds] : normal S1 and S2 [] : no respiratory distress [Respiration, Rhythm And Depth] : normal respiratory rhythm and effort [Exaggerated Use Of Accessory Muscles For Inspiration] : no accessory muscle use [Abnormal Walk] : normal gait [Skin Color & Pigmentation] : normal skin color and pigmentation [No Focal Deficits] : no focal deficits [Oriented To Time, Place, And Person] : oriented to person, place, and time [Affect] : the affect was normal [FreeTextEntry1] : large nceck

## 2020-10-28 NOTE — REVIEW OF SYSTEMS
[EDS: ESS=____] : daytime somnolence: ESS=[unfilled] [Fatigue] : fatigue [Snoring] : snoring [Witnessed Apneas] : witnessed apnea [Obesity] : obesity [A.M. Headache] : headache present upon awakening [Heartburn] : heartburn [Nocturia] : nocturia [Negative] : Psychiatric

## 2020-10-28 NOTE — ASSESSMENT
[FreeTextEntry1] : 55 y/o F with severe PITER who is here for initial visit.  The patient is advised that in addition to worsening sleep leading to daytime sleepiness, sleep apnea may be associated with worsening hypertension and may be a risk factor for cardiovascular disease and stroke. \par \par Treatment options for sleep disordered breathing were discussed.  The most rapid and successful treatment remains nasal CPAP or BilevelPAP.  Alternatives include upper airway surgery such as uvulopharyngoplasty or a dental appliance (better for milder cases).  Recently hypoglossal nerve stimulation has been used.  Positional therapy (avoidance of supine posture) can be helpful, and all patients should try to maintain a healthy weight and avoid alcohol or other sedating medications close to bedtime \par \par Treatment will be ordered with autotitrating CPAP, which will be downloaded after a few weeks of use to check compliance and optimize pressure based on efficacy.  If not comfortable with this treatment, polysomnography with CPAP titration may be needed.

## 2020-11-30 ENCOUNTER — APPOINTMENT (OUTPATIENT)
Dept: PULMONOLOGY | Facility: CLINIC | Age: 55
End: 2020-11-30

## 2020-12-23 ENCOUNTER — APPOINTMENT (OUTPATIENT)
Dept: PULMONOLOGY | Facility: CLINIC | Age: 55
End: 2020-12-23
Payer: COMMERCIAL

## 2020-12-23 VITALS
DIASTOLIC BLOOD PRESSURE: 82 MMHG | TEMPERATURE: 97.9 F | SYSTOLIC BLOOD PRESSURE: 138 MMHG | HEIGHT: 64 IN | BODY MASS INDEX: 40.97 KG/M2 | RESPIRATION RATE: 12 BRPM | HEART RATE: 91 BPM | WEIGHT: 240 LBS | OXYGEN SATURATION: 97 %

## 2020-12-23 PROCEDURE — 99214 OFFICE O/P EST MOD 30 MIN: CPT

## 2020-12-23 PROCEDURE — 99072 ADDL SUPL MATRL&STAF TM PHE: CPT

## 2020-12-23 NOTE — ASSESSMENT
[FreeTextEntry1] : Moderate obstructive sleep apnea (AHI 30), doing fairly well w CPAP, not comfortable.  Download today shows leak.  Given new under nose Dreamwear to try.  Despite some discomfort she is using adequately, with some improvement in sx.

## 2020-12-23 NOTE — HISTORY OF PRESENT ILLNESS
[FreeTextEntry1] : 12/23/2020 :  MADDY STARR is a 55 year female with PMHx GERD, sever PITER who was started on CPAP is now here for follow up.\par \par She reports using her CPAP very night for about 4 hours. uses the full nose mask and reports leakage. She feels  somewhat uncomfortable and still trying to get adjusted to the machine. \par \par Her download is not available today. However, her reports from her machine shows: usage of 22 nights out of 29, with median pressure of 11.2cmH2o, leak 30 and AHI 3.1.Her home health care company is  [employer] and she received her mask recently. \par \par She denies any new complains or diagnosis since the last visit.\par \par

## 2020-12-23 NOTE — PHYSICAL EXAM
[Well Groomed] : well groomed [General Appearance - In No Acute Distress] : no acute distress [Normal Oropharynx] : normal oropharynx [Neck Appearance] : the appearance of the neck was normal [Neck Cervical Mass (___cm)] : no neck mass was observed [Jugular Venous Distention Increased] : there was no jugular-venous distention [Thyroid Nodule] : there were no palpable thyroid nodules [Apical Impulse] : the apical impulse was normal [Heart Rate And Rhythm] : heart rate was normal and rhythm regular [Heart Sounds] : normal S1 and S2 [Heart Sounds Gallop] : no gallops [Respiration, Rhythm And Depth] : normal respiratory rhythm and effort [Exaggerated Use Of Accessory Muscles For Inspiration] : no accessory muscle use [Auscultation Breath Sounds / Voice Sounds] : lungs were clear to auscultation bilaterally [Abnormal Walk] : normal gait [Nail Clubbing] : no clubbing of the fingernails [Cyanosis, Localized] : no localized cyanosis [Skin Color & Pigmentation] : normal skin color and pigmentation [Skin Turgor] : normal skin turgor [] : no rash [No Focal Deficits] : no focal deficits [Oriented To Time, Place, And Person] : oriented to person, place, and time [Impaired Insight] : insight and judgment were intact [Affect] : the affect was normal [Mood] : the mood was normal

## 2020-12-27 ENCOUNTER — FORM ENCOUNTER (OUTPATIENT)
Age: 55
End: 2020-12-27

## 2021-01-22 ENCOUNTER — APPOINTMENT (OUTPATIENT)
Dept: OTOLARYNGOLOGY | Facility: CLINIC | Age: 56
End: 2021-01-22
Payer: COMMERCIAL

## 2021-01-22 ENCOUNTER — APPOINTMENT (OUTPATIENT)
Dept: OTOLARYNGOLOGY | Facility: CLINIC | Age: 56
End: 2021-01-22

## 2021-01-22 VITALS
HEART RATE: 81 BPM | HEIGHT: 64 IN | WEIGHT: 240 LBS | SYSTOLIC BLOOD PRESSURE: 125 MMHG | OXYGEN SATURATION: 97 % | BODY MASS INDEX: 40.97 KG/M2 | TEMPERATURE: 98.1 F | DIASTOLIC BLOOD PRESSURE: 88 MMHG

## 2021-01-22 PROCEDURE — 99072 ADDL SUPL MATRL&STAF TM PHE: CPT

## 2021-01-22 PROCEDURE — 31575 DIAGNOSTIC LARYNGOSCOPY: CPT

## 2021-01-22 PROCEDURE — 99215 OFFICE O/P EST HI 40 MIN: CPT | Mod: 25

## 2021-01-22 NOTE — REASON FOR VISIT
[Subsequent Evaluation] : a subsequent evaluation for [Gastroesophageal Reflux] : gastroesophageal reflux [Sleep Apnea/ Snoring] : sleep apnea/ snoring

## 2021-01-22 NOTE — HISTORY OF PRESENT ILLNESS
[de-identified] : 54F who presents with chronic post nasal drip and cough. Pt notes that she has had a dry cough for over 20 years. She notes that she feels an irritation in her throat and a sensation that she needs to clear her throat. She associates this with a burning sensation. Sometimes it is a throat clear and sometimes a "coughing fit." This occurs 3-4 times daily. It is dry in nature. There are no temporal factors. She feels that post nasal drip as played a role in the past. She was seen by Dr. Mckeon for cough and breathing difficulty. Notes indicate normal PFTs. \par \par In terms of throat symptoms she does have worsening dysphonia over the past several months. Her voice is hoarse and gravely. She denies any dysphagia of solids or liquids, odynophagia. She admits to drinking green tea, eating tomato based products, citrus and blueberries weekly, but denies frequent alcohol consumption, coffee or frequent chocolate.\par \par Patient reports she has a history of sinusitis s/p FESS in 1995, but since has had continued nasal congestion, nasal drainage, facial pressure w/ headaches. She denies anosmia or h/o allergies. \par \par Patient also admits to right ear pain that has been there for 5+ years. She has been prescribed different types of drops for it without any relief. She denies any otorrhea, hearing changes tinnitus, current dizziness (has had vertigo in the past). \par \par No other ENT complaints. No pertinent SH/FH.\par -\par 6/12/20-\par Since the last visit the patient reports she has been strictly adhering to the diet modifications, but admits has not been taking the famotidine as she is trying not to avoid taking medications. She admits to improve in terms of her voice, but continued symptoms of, cough, throat clearing, otalgia. She believes that her nasal symptoms have cleared up.\par \par Additionally today we did discuss her sleep and noted that she has a hx of PITER, but does not use CPAP. Can feel tired throughout the day. Wakes up in the middle of the night frequently.\par - \par Interval History: -\par Update 7/17/20\par Overall symptoms have remained unchanged. Since the last visit she has not been taking the anti reflux medications. She has not been fully following the low acid diet. She states that she is changing to a plant based diet in the upcoming weeks. She perceived some continued dysphonia and says people think she sounds manly on the phone.\par \par She did have an audio/tymp. which showed normal hearing bilaterally and type A tymp bilaterally. Please see medical record for full report. Additionally she did have a polysomnogram and this was consistent with Severe PITER. She will be following up with sleep medicine for CPAP. \par -\par Update 10/23/20\par Patient is stable overall. She reports mild improvement of her chronic cough, but denies any changes to voice, burning sensation of the throat and stomach. Overall 20% improvement in symptoms.  She has improved her diet slightly, and she has been intermittent with the anti-reflux medications.  She did undergo endoscopy with GI and he agree with reflux changes.  She has not follow up with Sleep medicine yet nor started CPAP.  She has not followed up with dentistry regrading TMJ \par - [FreeTextEntry1] : Update 1/22/21\par Patient is stable overall.  Throat issues have essentially remained unchanged.  She will intermittently still feel the need to cough or clear her throat.  She has improved her diet slightly, however she has not used the anti-reflux medications.  Otherwise no issues, chewing, eating, swallowing, or breathing.  Still notes a deep full voice.\par \par Still has not seen a TMJ specialist.  Still waiting on referral from PCP.  She did receive her CPAP for severe PITER and has been using this month with a significant reduction in AHI.

## 2021-01-22 NOTE — PROCEDURE
[de-identified] : Laryngoscopy: Flexible Laryngoscopy - Procedure Note\par \par Pre-operative Diagnosis: chronic cough and throat burning\par Post-operative Diagnosis: allergic mucosal changes, LPR, pharyngeal fullness, elongated uvula\par Anesthesia: Topical - 1 % Lidocaine/Phenylephrine\par Procedure: Flexible Laryngoscopy\par \par Procedure Details: \par The patient was placed in the sitting position. After decongestant and anesthesia were applied the laryngoscope was passed. The nasal cavities, nasopharynx, oropharynx, hypopharynx, and larynx were all examined. Vocal folds were examined during respiration and phonation. The following findings were noted:\par \par Findings: \par Nose: Septum is midline, turbinates are normal, nasal airways patent, mucosa normal\par Nasopharynx: Adenoids normal, no masses, eustachian tube normal\par Oropharynx: Pharyngeal walls symmetric and without lesion. Tonsils/fossae symmetric\par Hypopharynx: Hypopharynx and pyriform sinuses without lesion. No masses or asymmetry. No pooling of secretions.\par Larynx: Uvula was sitting on epiglottis a times.  Epiglottis and aryepiglottic folds were sharp and crisp bilaterally. Bilateral false and true vocal folds normal appearance. Bilateral vocal folds fully mobile and symmetric. Airway was widely patent. + erythema and edema of vocal process and post cricoid region\par \par Condition: Stable. Patient tolerated procedure well.\par \par Complications: None

## 2021-01-22 NOTE — ASSESSMENT
[FreeTextEntry1] : 55F who presents with multiple ENT issues. Hx and exam are consistent with LPR, severe PITER, and TMJ. Again I discussed that these three things are very much related.   In terms of chronic cough, this has continued and I still believe that this is related to LPR/globus and possible sensitization. I am recommending dietary and behavioral modification as well as again discussed omeprazole in the AM and famotidine in the PM at length. She said she would try this regiment.  We again discussed the need for weight loss as her neck circumference can contribute to both PITER and LPR. She is using CPAP\par \par We also discussed voice hygiene, cough avoidance and sips of water throughout the day. Informational sheet given to patient.  If dysphonia with deep voice persists we can consider consultation with speech pathology. \par \par   In terms of otalgia, physical exam findings are negative. Audio and tymp are normal. Pt to follow up with dentistry for TMJ evaluation and management.\par \par - dietary and behavioral change to reduce acid reflux\par - omeprazole and famotidine\par - sleep medicine to start CPAP for severe PITER\par - weight loss\par - dentistry follow up for TMJ\par - fu 3 mo\par - If she tried anti reflux meds and still symptomatic, will send for pH testing\par - can also consider elongated uvula at next visit.

## 2021-01-22 NOTE — PHYSICAL EXAM
[Normal] : mucosa is normal [Midline] : trachea located in midline position [Laryngoscopy Performed] : laryngoscopy was performed, see procedure section for findings [FreeTextEntry1] : improved, normal voice [de-identified] : + crepitus on right [de-identified] : thick neck with increased circumference [de-identified] : + elongated uvula

## 2021-03-24 ENCOUNTER — APPOINTMENT (OUTPATIENT)
Dept: PULMONOLOGY | Facility: CLINIC | Age: 56
End: 2021-03-24

## 2021-04-12 ENCOUNTER — APPOINTMENT (OUTPATIENT)
Dept: PULMONOLOGY | Facility: CLINIC | Age: 56
End: 2021-04-12

## 2021-07-27 ENCOUNTER — APPOINTMENT (OUTPATIENT)
Dept: OTOLARYNGOLOGY | Facility: CLINIC | Age: 56
End: 2021-07-27
Payer: COMMERCIAL

## 2021-07-27 VITALS
HEART RATE: 87 BPM | BODY MASS INDEX: 40.97 KG/M2 | OXYGEN SATURATION: 95 % | HEIGHT: 64 IN | WEIGHT: 240 LBS | DIASTOLIC BLOOD PRESSURE: 79 MMHG | SYSTOLIC BLOOD PRESSURE: 117 MMHG | TEMPERATURE: 97.6 F

## 2021-07-27 PROCEDURE — 31579 LARYNGOSCOPY TELESCOPIC: CPT

## 2021-07-27 PROCEDURE — 99214 OFFICE O/P EST MOD 30 MIN: CPT | Mod: 25

## 2021-07-27 NOTE — HISTORY OF PRESENT ILLNESS
[de-identified] : 54F who presents with chronic post nasal drip and cough. Pt notes that she has had a dry cough for over 20 years. She notes that she feels an irritation in her throat and a sensation that she needs to clear her throat. She associates this with a burning sensation. Sometimes it is a throat clear and sometimes a "coughing fit." This occurs 3-4 times daily. It is dry in nature. There are no temporal factors. She feels that post nasal drip as played a role in the past. She was seen by Dr. Mckeon for cough and breathing difficulty. Notes indicate normal PFTs. \par \par In terms of throat symptoms she does have worsening dysphonia over the past several months. Her voice is hoarse and gravely. She denies any dysphagia of solids or liquids, odynophagia. She admits to drinking green tea, eating tomato based products, citrus and blueberries weekly, but denies frequent alcohol consumption, coffee or frequent chocolate.\par \par Patient reports she has a history of sinusitis s/p FESS in 1995, but since has had continued nasal congestion, nasal drainage, facial pressure w/ headaches. She denies anosmia or h/o allergies. \par \par Patient also admits to right ear pain that has been there for 5+ years. She has been prescribed different types of drops for it without any relief. She denies any otorrhea, hearing changes tinnitus, current dizziness (has had vertigo in the past). \par \par No other ENT complaints. No pertinent SH/FH.\par -\par 6/12/20-\par Since the last visit the patient reports she has been strictly adhering to the diet modifications, but admits has not been taking the famotidine as she is trying not to avoid taking medications. She admits to improve in terms of her voice, but continued symptoms of, cough, throat clearing, otalgia. She believes that her nasal symptoms have cleared up.\par \par Additionally today we did discuss her sleep and noted that she has a hx of PITER, but does not use CPAP. Can feel tired throughout the day. Wakes up in the middle of the night frequently.\par - \par Interval History: -\par Update 7/17/20\par Overall symptoms have remained unchanged. Since the last visit she has not been taking the anti reflux medications. She has not been fully following the low acid diet. She states that she is changing to a plant based diet in the upcoming weeks. She perceived some continued dysphonia and says people think she sounds manly on the phone.\par \par She did have an audio/tymp. which showed normal hearing bilaterally and type A tymp bilaterally. Please see medical record for full report. Additionally she did have a polysomnogram and this was consistent with Severe PITER. She will be following up with sleep medicine for CPAP. \par -\par Update 10/23/20\par Patient is stable overall. She reports mild improvement of her chronic cough, but denies any changes to voice, burning sensation of the throat and stomach. Overall 20% improvement in symptoms. She has improved her diet slightly, and she has been intermittent with the anti-reflux medications. She did undergo endoscopy with GI and he agree with reflux changes. She has not follow up with Sleep medicine yet nor started CPAP. She has not followed up with dentistry regrading TMJ \par - \par Interval History: Update 1/22/21\par Patient is stable overall. Throat issues have essentially remained unchanged. She will intermittently still feel the need to cough or clear her throat. She has improved her diet slightly, however she has not used the anti-reflux medications. Otherwise no issues, chewing, eating, swallowing, or breathing. Still notes a deep full voice.\par \par Still has not seen a TMJ specialist. Still waiting on referral from PCP. She did receive her CPAP for severe PITER and has been using this month with a significant reduction in AHI. \par -\par  [FreeTextEntry1] : Update 7/27/21\par Pt is overall stable and well.  She uses CPAP nightly for her PITER and doing well with that.  She also admits to voice improvement though still feels that this is deep in nature.   Patient returns with continued left facial discomfort and has not been seen by TMJ specialist/OMFS.  Still with dry intermittent cough, but has not tried anti reflux medications, though she has changed her diet somewhat.

## 2021-07-27 NOTE — ASSESSMENT
[FreeTextEntry1] : 55F who presents with multiple ENT issues. Hx and exam are consistent with LPR, severe PITER, and TMJ. Again I discussed that these three things are very much related. In terms of chronic cough, this has continued and I still believe that this is related to LPR/globus and possible sensitization. I am recommending dietary and behavioral modification as well as again discussed omeprazole in the AM and famotidine in the PM at length. She said she is not interested in taking medication. We again discussed the need for weight loss as her neck circumference can contribute to both PITER and LPR. She is using CPAP\par \par We also discussed voice hygiene, cough avoidance and sips of water throughout the day. Informational sheet given to patient.  As dysphonia with deep voice persists we will consult with speech pathology. \par \par In terms of otalgia, physical exam findings are negative. Audio and tymp are normal. Pt to follow up with dentistry for TMJ evaluation and management.\par \par Plan:\par - GI referral for pH probe\par - If found not to have reflux, will treat for neurogenic cough\par - dental referral for TMJ\par - c/s speech pathology for voice eval and therapy\par - f/u in 3 months

## 2021-07-27 NOTE — PHYSICAL EXAM
[Normal] : mucosa is normal [Midline] : trachea located in midline position [Laryngoscopy Performed] : laryngoscopy was performed, see procedure section for findings [de-identified] : thick neck with increased circumference [de-identified] : + crepitus on right [de-identified] : + elongated uvula

## 2021-07-27 NOTE — PROCEDURE
[de-identified] : Procedure Note\par \par Pre-operative Diagnosis: chronic cough, throat discomfort\par Post-operative Diagnosis: LPR, pharyngeal fullness and weakness \par Anesthesia: Topical - 1 % Lidocaine/Phenylephrine\par Procedure: Flexible Laryngoscopy with Stroboscopy\par \par Procedure Details: \par The patient was placed in the sitting position. After decongestant and anesthesia were applied the laryngoscope was passed. The nasal cavities, nasopharynx, oropharynx, hypopharynx, and larynx were all examined. Vocal folds were examined during respiration and phonation. The following findings were noted:\par \par Findings: \par Nose: Septum is midline, turbinates are normal, nasal airways patent, mucosa normal\par Nasopharynx: Adenoids normal, no masses, eustachian tube normal\par Oropharynx: Pharyngeal walls symmetric and without lesion. Tonsils/fossae symmetric\par Hypopharynx: Hypopharynx and pyriform sinuses without lesion. No masses or asymmetry. No pooling of secretions, crowding of the oropharynx and hypopharynx.\par Larynx: Epiglottis and aryepiglottic folds were sharp and crisp bilaterally. Bilateral false vocal folds normal appearance. Airway was widely patent. + erythema and edema of vocal process and post cricoid region\par \par Strobe Exam Ratings\par 		\par TVF Appearance: +erythema and edema of vocal process and post cricoid region.\par TVF Mobility: normal\par Edema/hypertrophy: +erythema and edema of vocal process and post cricoid region.\par Mucus on TVF: +\par Glottic Closure: adequate\par Mucosal Wave: normal\par Amplitude of Vibration: normal\par Phase: symmetric\par Supraglottic Hyperfunction: minimal\par Other Findings: none\par \par Condition: Stable. Patient tolerated procedure well.\par \par Complications: None

## 2021-08-23 ENCOUNTER — APPOINTMENT (OUTPATIENT)
Dept: PULMONOLOGY | Facility: CLINIC | Age: 56
End: 2021-08-23
Payer: COMMERCIAL

## 2021-08-23 VITALS
BODY MASS INDEX: 39.78 KG/M2 | WEIGHT: 233 LBS | TEMPERATURE: 97 F | HEART RATE: 82 BPM | OXYGEN SATURATION: 96 % | HEIGHT: 64 IN | SYSTOLIC BLOOD PRESSURE: 143 MMHG | DIASTOLIC BLOOD PRESSURE: 68 MMHG

## 2021-08-23 DIAGNOSIS — R06.83 SNORING: ICD-10-CM

## 2021-08-23 PROCEDURE — 99213 OFFICE O/P EST LOW 20 MIN: CPT

## 2021-08-23 NOTE — PHYSICAL EXAM
[Well Groomed] : well groomed [General Appearance - In No Acute Distress] : no acute distress [Normal Oropharynx] : normal oropharynx [Neck Appearance] : the appearance of the neck was normal [Neck Cervical Mass (___cm)] : no neck mass was observed [Jugular Venous Distention Increased] : there was no jugular-venous distention [Thyroid Nodule] : there were no palpable thyroid nodules [] : no respiratory distress [Exaggerated Use Of Accessory Muscles For Inspiration] : no accessory muscle use [Respiration, Rhythm And Depth] : normal respiratory rhythm and effort [Auscultation Breath Sounds / Voice Sounds] : lungs were clear to auscultation bilaterally [Abnormal Walk] : normal gait [No Focal Deficits] : no focal deficits [Oriented To Time, Place, And Person] : oriented to person, place, and time [Impaired Insight] : insight and judgment were intact [Affect] : the affect was normal [Mood] : the mood was normal

## 2021-08-24 NOTE — HISTORY OF PRESENT ILLNESS
[FreeTextEntry1] : 12/23/2020 :  MADDY STARR is a 55 year female with PMHx GERD, sever PITER who was started on CPAP is now here for 3 months follow up.\par \par She reports using her CPAP very night for about 4 hours. uses the full pillow mask. \par \par Her download from today shows: compliance at 57% with average use of 5 hours and 2o min, AHI 1.7, median pressure of 7.2 and maximum of 11.1. She received her supplies last week.\par \par She denies any new complains or diagnosis since the last visit.\par \par

## 2021-08-24 NOTE — ASSESSMENT
[FreeTextEntry1] : 54 y/o F with sever PITER on CPAP and good compliance who is here for 3 months follow up. Doing well with CPAP and benefiting fro rx.

## 2021-09-08 ENCOUNTER — APPOINTMENT (OUTPATIENT)
Dept: OTOLARYNGOLOGY | Facility: CLINIC | Age: 56
End: 2021-09-08
Payer: COMMERCIAL

## 2021-09-08 PROCEDURE — 31579 LARYNGOSCOPY TELESCOPIC: CPT

## 2021-09-08 PROCEDURE — 92524 BEHAVRAL QUALIT ANALYS VOICE: CPT | Mod: GN

## 2021-09-15 ENCOUNTER — APPOINTMENT (OUTPATIENT)
Dept: OTOLARYNGOLOGY | Facility: CLINIC | Age: 56
End: 2021-09-15

## 2021-09-22 ENCOUNTER — APPOINTMENT (OUTPATIENT)
Dept: OTOLARYNGOLOGY | Facility: CLINIC | Age: 56
End: 2021-09-22
Payer: COMMERCIAL

## 2021-09-22 PROCEDURE — 92507 TX SP LANG VOICE COMM INDIV: CPT | Mod: GN

## 2021-09-29 ENCOUNTER — APPOINTMENT (OUTPATIENT)
Dept: OTOLARYNGOLOGY | Facility: CLINIC | Age: 56
End: 2021-09-29
Payer: COMMERCIAL

## 2021-09-29 PROCEDURE — 92507 TX SP LANG VOICE COMM INDIV: CPT | Mod: GN

## 2021-10-06 ENCOUNTER — APPOINTMENT (OUTPATIENT)
Dept: OTOLARYNGOLOGY | Facility: CLINIC | Age: 56
End: 2021-10-06

## 2021-11-29 ENCOUNTER — APPOINTMENT (OUTPATIENT)
Dept: OTOLARYNGOLOGY | Facility: CLINIC | Age: 56
End: 2021-11-29
Payer: COMMERCIAL

## 2021-11-29 VITALS
DIASTOLIC BLOOD PRESSURE: 89 MMHG | HEIGHT: 64 IN | TEMPERATURE: 97.9 F | SYSTOLIC BLOOD PRESSURE: 137 MMHG | OXYGEN SATURATION: 96 % | BODY MASS INDEX: 39.78 KG/M2 | WEIGHT: 233 LBS | HEART RATE: 94 BPM

## 2021-11-29 DIAGNOSIS — H92.01 OTALGIA, RIGHT EAR: ICD-10-CM

## 2021-11-29 DIAGNOSIS — E66.9 OBESITY, UNSPECIFIED: ICD-10-CM

## 2021-11-29 PROCEDURE — 99215 OFFICE O/P EST HI 40 MIN: CPT | Mod: 25

## 2021-11-29 PROCEDURE — 31579 LARYNGOSCOPY TELESCOPIC: CPT

## 2021-11-29 RX ORDER — FAMOTIDINE 20 MG/1
20 TABLET, FILM COATED ORAL
Qty: 30 | Refills: 3 | Status: DISCONTINUED | COMMUNITY
Start: 2020-03-13 | End: 2021-11-29

## 2021-11-29 RX ORDER — MELOXICAM 15 MG/1
15 TABLET ORAL
Qty: 30 | Refills: 0 | Status: DISCONTINUED | COMMUNITY
Start: 2019-06-28 | End: 2021-11-29

## 2021-11-29 RX ORDER — OMEPRAZOLE 40 MG/1
40 CAPSULE, DELAYED RELEASE ORAL
Qty: 30 | Refills: 2 | Status: DISCONTINUED | COMMUNITY
End: 2021-11-29

## 2021-11-29 RX ORDER — ORLISTAT 120 MG/1
120 CAPSULE ORAL 3 TIMES DAILY
Qty: 90 | Refills: 2 | Status: DISCONTINUED | COMMUNITY
Start: 2019-08-06 | End: 2021-11-29

## 2021-11-29 RX ORDER — OMEPRAZOLE 40 MG/1
40 CAPSULE, DELAYED RELEASE ORAL
Qty: 30 | Refills: 3 | Status: ACTIVE | COMMUNITY
Start: 2021-01-22 | End: 1900-01-01

## 2021-11-29 RX ORDER — FAMOTIDINE 20 MG/1
20 TABLET, FILM COATED ORAL
Qty: 30 | Refills: 0 | Status: DISCONTINUED | COMMUNITY
Start: 2021-01-22 | End: 2021-11-29

## 2021-11-29 RX ORDER — FAMOTIDINE 20 MG/1
20 TABLET, FILM COATED ORAL
Qty: 30 | Refills: 3 | Status: ACTIVE | COMMUNITY
Start: 2021-11-29 | End: 1900-01-01

## 2021-11-29 NOTE — HISTORY OF PRESENT ILLNESS
[de-identified] : 54F who presents with chronic post nasal drip and cough. Pt notes that she has had a dry cough for over 20 years. She notes that she feels an irritation in her throat and a sensation that she needs to clear her throat. She associates this with a burning sensation. Sometimes it is a throat clear and sometimes a "coughing fit." This occurs 3-4 times daily. It is dry in nature. There are no temporal factors. She feels that post nasal drip as played a role in the past. She was seen by Dr. Mckeon for cough and breathing difficulty. Notes indicate normal PFTs. \par \par In terms of throat symptoms she does have worsening dysphonia over the past several months. Her voice is hoarse and gravely. She denies any dysphagia of solids or liquids, odynophagia. She admits to drinking green tea, eating tomato based products, citrus and blueberries weekly, but denies frequent alcohol consumption, coffee or frequent chocolate.\par \par Patient reports she has a history of sinusitis s/p FESS in 1995, but since has had continued nasal congestion, nasal drainage, facial pressure w/ headaches. She denies anosmia or h/o allergies. \par \par Patient also admits to right ear pain that has been there for 5+ years. She has been prescribed different types of drops for it without any relief. She denies any otorrhea, hearing changes tinnitus, current dizziness (has had vertigo in the past). \par \par No other ENT complaints. No pertinent SH/FH.\par -\par 6/12/20-\par Since the last visit the patient reports she has been strictly adhering to the diet modifications, but admits has not been taking the famotidine as she is trying not to avoid taking medications. She admits to improve in terms of her voice, but continued symptoms of, cough, throat clearing, otalgia. She believes that her nasal symptoms have cleared up.\par \par Additionally today we did discuss her sleep and noted that she has a hx of PITER, but does not use CPAP. Can feel tired throughout the day. Wakes up in the middle of the night frequently.\par - \par Interval History: -\par Update 7/17/20\par Overall symptoms have remained unchanged. Since the last visit she has not been taking the anti reflux medications. She has not been fully following the low acid diet. She states that she is changing to a plant based diet in the upcoming weeks. She perceived some continued dysphonia and says people think she sounds manly on the phone.\par \par She did have an audio/tymp. which showed normal hearing bilaterally and type A tymp bilaterally. Please see medical record for full report. Additionally she did have a polysomnogram and this was consistent with Severe PITER. She will be following up with sleep medicine for CPAP. \par -\par Update 10/23/20\par Patient is stable overall. She reports mild improvement of her chronic cough, but denies any changes to voice, burning sensation of the throat and stomach. Overall 20% improvement in symptoms. She has improved her diet slightly, and she has been intermittent with the anti-reflux medications. She did undergo endoscopy with GI and he agree with reflux changes. She has not follow up with Sleep medicine yet nor started CPAP. She has not followed up with dentistry regrading TMJ \par - \par Interval History: Update 1/22/21\par Patient is stable overall. Throat issues have essentially remained unchanged. She will intermittently still feel the need to cough or clear her throat. She has improved her diet slightly, however she has not used the anti-reflux medications. Otherwise no issues, chewing, eating, swallowing, or breathing. Still notes a deep full voice.\par \par Still has not seen a TMJ specialist. Still waiting on referral from PCP. She did receive her CPAP for severe PITER and has been using this month with a significant reduction in AHI. \par -\par Update 7/27/21\par Pt is overall stable and well.  She uses CPAP nightly for her PITER and doing well with that.  She also admits to voice improvement though still feels that this is deep in nature.   Patient returns with continued left facial discomfort and has not been seen by TMJ specialist/OMFS.  Still with dry intermittent cough, but has not tried anti reflux medications, though she has changed her diet somewhat.  \par -\par  [FreeTextEntry1] : Update 11/29/21\par Pt is overall stable and well.  She uses CPAP nightly for her PITER and doing well with that.  She also admits to voice improvement since working with SLP.  In terms of facial discomfort she was sen by TMJ specialist/OMFS.  They advised she does not have TMJ, but a tooth is causing the pain and they plan to remove soon.  \par \par Still with dry intermittent cough but now only occurring when not taking PPI.  Still has not tried H2 blocker.  When she does take the medication she is asymptomatic.  She has continued to changed her diet somewhat.  She is requesting GI consultation (which we provided at last visit, but she did not schedule)

## 2021-11-29 NOTE — PROCEDURE
[de-identified] : Pre-operative Diagnosis: chronic cough, throat discomfort\par Post-operative Diagnosis: LPR, pharyngeal fullness and weakness \par Anesthesia: Topical - 1 % Lidocaine/Phenylephrine\par Procedure: Flexible Laryngoscopy with Stroboscopy\par \par Procedure Details: \par The patient was placed in the sitting position. After decongestant and anesthesia were applied the laryngoscope was passed. The nasal cavities, nasopharynx, oropharynx, hypopharynx, and larynx were all examined. Vocal folds were examined during respiration and phonation. The following findings were noted:\par \par Findings: \par Nose: Septum is midline, turbinates are normal, nasal airways patent, mucosa normal\par Nasopharynx: Adenoids normal, no masses, eustachian tube normal\par Oropharynx: Pharyngeal walls symmetric and without lesion. Tonsils/fossae symmetric\par Hypopharynx: Hypopharynx and pyriform sinuses without lesion. No masses or asymmetry. No pooling of secretions, crowding of the oropharynx and hypopharynx.\par Larynx: Epiglottis and aryepiglottic folds were sharp and crisp bilaterally. Bilateral false vocal folds normal appearance. Airway was widely patent. + erythema and edema of vocal process and post cricoid region\par \par Strobe Exam Ratings\par 		\par TVF Appearance: +erythema and edema of vocal process and post cricoid region.\par TVF Mobility: normal\par Edema/hypertrophy: +erythema and edema of vocal process and post cricoid region.\par Mucus on TVF: +\par Glottic Closure: adequate\par Mucosal Wave: normal\par Amplitude of Vibration: normal\par Phase: symmetric\par Supraglottic Hyperfunction: minimal\par Other Findings: none\par \par Condition: Stable. Patient tolerated procedure well.\par \par Complications: None.

## 2021-11-29 NOTE — ASSESSMENT
[FreeTextEntry1] : 56F who presents with multiple ENT issues. Hx and exam are consistent with LPR, severe PITER, and TMJ. Again I discussed that these three things are very much related. In terms of chronic cough, this has continued and I still believe that this is related to LPR/globus and possible sensitization. I am recommending dietary and behavioral modification as well as again discussed omeprazole in the AM and famotidine in the PM at length. She has had improvement when taking medication and she agreed to use both now.  We also again discussed GI consultation which she agreed to. We again discussed the need for weight loss as her neck circumference can contribute to both PITER and LPR. She is using CPAP\par \par We also discussed voice hygiene, cough avoidance and sips of water throughout the day. Informational sheet given to patient.  She is working with SLP and notes improvement in voice.  She will continue to work with them.\par \par Dentistry noted no TMJ, but that a tooth was causing the facial pain.  they are planning removal.\par \par \par Plan:\par - GI referral, contacts given\par - If found not to have reflux, will treat for neurogenic cough\par - dental fu\par - cont speech therapy\par - f/u in 3 months

## 2021-11-29 NOTE — PHYSICAL EXAM
[Normal] : mucosa is normal [Midline] : trachea located in midline position [Laryngoscopy Performed] : laryngoscopy was performed, see procedure section for findings [de-identified] : thick neck with increased circumference [de-identified] : + crepitus on right [de-identified] : + elongated uvula

## 2022-01-12 ENCOUNTER — APPOINTMENT (OUTPATIENT)
Dept: UROLOGY | Facility: CLINIC | Age: 57
End: 2022-01-12
Payer: COMMERCIAL

## 2022-01-12 ENCOUNTER — NON-APPOINTMENT (OUTPATIENT)
Age: 57
End: 2022-01-12

## 2022-01-12 VITALS
HEIGHT: 64 IN | TEMPERATURE: 97.8 F | BODY MASS INDEX: 38.41 KG/M2 | DIASTOLIC BLOOD PRESSURE: 81 MMHG | HEART RATE: 67 BPM | WEIGHT: 225 LBS | SYSTOLIC BLOOD PRESSURE: 119 MMHG

## 2022-01-12 DIAGNOSIS — R35.1 NOCTURIA: ICD-10-CM

## 2022-01-12 DIAGNOSIS — R39.9 UNSPECIFIED SYMPTOMS AND SIGNS INVOLVING THE GENITOURINARY SYSTEM: ICD-10-CM

## 2022-01-12 LAB
BILIRUB UR QL STRIP: NORMAL
CLARITY UR: CLEAR
COLLECTION METHOD: NORMAL
GLUCOSE UR-MCNC: NORMAL
HCG UR QL: 0.2 EU/DL
HGB UR QL STRIP.AUTO: NORMAL
KETONES UR-MCNC: NORMAL
LEUKOCYTE ESTERASE UR QL STRIP: NORMAL
NITRITE UR QL STRIP: NORMAL
PH UR STRIP: 5
PROT UR STRIP-MCNC: NORMAL
SP GR UR STRIP: 1.02

## 2022-01-12 PROCEDURE — 51798 US URINE CAPACITY MEASURE: CPT

## 2022-01-12 PROCEDURE — 81003 URINALYSIS AUTO W/O SCOPE: CPT | Mod: QW

## 2022-01-12 PROCEDURE — 99204 OFFICE O/P NEW MOD 45 MIN: CPT

## 2022-01-12 NOTE — LETTER BODY
[Dear  ___] : Dear  [unfilled], [Courtesy Letter:] : I had the pleasure of seeing your patient, [unfilled], in my office today. [Please see my note below.] : Please see my note below. [Consult Closing:] : Thank you very much for allowing me to participate in the care of this patient.  If you have any questions, please do not hesitate to contact me. [Sincerely,] : Sincerely, [FreeTextEntry3] : Rina Bright MD

## 2022-01-12 NOTE — HISTORY OF PRESENT ILLNESS
[FreeTextEntry1] : 56 year old woman with PITER presents with primary complaint of nocturia. She has had bothersome nocturia for over 5 years, waking up 3-6 times at night. No bothersome frequency or urgency, weak stream or incomplete emptying. No dysuria, fevers, chills, hematuria, cloudy urine, foul smelling urine or flank pain. She does not drink caffeine or alcohol. She drinks 120 ounces of water throughout the day and night, though mostly during the day. She uses CPAP most of the time at night. \par \par PVR 28 cc

## 2022-01-12 NOTE — ASSESSMENT
[FreeTextEntry1] : 56 year old woman with history of PITER and bothersome nocturia for the last 6 years. We discussed possible etiologies of nocturia, including behavioral causes, PITER, overactive bladder, primary nocturia. \par  - Behavioral modifications: limit fluid intake prior to bedtime, overall try to reduce fluid intake from 120 ounces to ~64 ounces. High volume fluid intake is likely contributing. \par  - Ensure using CPAP\par  - Complete voiding diary\par  - F/U to discuss

## 2022-01-14 LAB
APPEARANCE: ABNORMAL
BACTERIA UR CULT: NORMAL
BACTERIA: NEGATIVE
BILIRUBIN URINE: NEGATIVE
BLOOD URINE: NEGATIVE
COLOR: YELLOW
GLUCOSE QUALITATIVE U: NEGATIVE
HYALINE CASTS: 2 /LPF
KETONES URINE: NORMAL
LEUKOCYTE ESTERASE URINE: ABNORMAL
MICROSCOPIC-UA: NORMAL
NITRITE URINE: NEGATIVE
PH URINE: 5.5
PROTEIN URINE: NORMAL
RED BLOOD CELLS URINE: 2 /HPF
SPECIFIC GRAVITY URINE: 1.02
SQUAMOUS EPITHELIAL CELLS: 4 /HPF
UROBILINOGEN URINE: NORMAL
WHITE BLOOD CELLS URINE: 3 /HPF

## 2022-01-31 PROBLEM — G47.30 SEVERE SLEEP APNEA: Status: ACTIVE | Noted: 2020-10-28

## 2022-02-02 ENCOUNTER — NON-APPOINTMENT (OUTPATIENT)
Age: 57
End: 2022-02-02

## 2022-02-02 ENCOUNTER — APPOINTMENT (OUTPATIENT)
Dept: UROLOGY | Facility: CLINIC | Age: 57
End: 2022-02-02

## 2022-02-02 DIAGNOSIS — G47.30 SLEEP APNEA, UNSPECIFIED: ICD-10-CM

## 2022-02-28 ENCOUNTER — APPOINTMENT (OUTPATIENT)
Dept: PULMONOLOGY | Facility: CLINIC | Age: 57
End: 2022-02-28
Payer: COMMERCIAL

## 2022-02-28 ENCOUNTER — APPOINTMENT (OUTPATIENT)
Dept: OTOLARYNGOLOGY | Facility: CLINIC | Age: 57
End: 2022-02-28
Payer: COMMERCIAL

## 2022-02-28 VITALS
BODY MASS INDEX: 38.76 KG/M2 | DIASTOLIC BLOOD PRESSURE: 86 MMHG | SYSTOLIC BLOOD PRESSURE: 133 MMHG | TEMPERATURE: 98 F | HEIGHT: 64 IN | HEART RATE: 74 BPM | WEIGHT: 227 LBS

## 2022-02-28 VITALS
WEIGHT: 227 LBS | BODY MASS INDEX: 38.76 KG/M2 | HEIGHT: 64 IN | TEMPERATURE: 98.1 F | SYSTOLIC BLOOD PRESSURE: 130 MMHG | HEART RATE: 73 BPM | OXYGEN SATURATION: 99 % | DIASTOLIC BLOOD PRESSURE: 79 MMHG

## 2022-02-28 DIAGNOSIS — G47.33 OBSTRUCTIVE SLEEP APNEA (ADULT) (PEDIATRIC): ICD-10-CM

## 2022-02-28 DIAGNOSIS — K21.9 GASTRO-ESOPHAGEAL REFLUX DISEASE W/OUT ESOPHAGITIS: ICD-10-CM

## 2022-02-28 DIAGNOSIS — R49.0 DYSPHONIA: ICD-10-CM

## 2022-02-28 DIAGNOSIS — M72.2 PLANTAR FASCIAL FIBROMATOSIS: ICD-10-CM

## 2022-02-28 DIAGNOSIS — R09.81 NASAL CONGESTION: ICD-10-CM

## 2022-02-28 DIAGNOSIS — R05.3 CHRONIC COUGH: ICD-10-CM

## 2022-02-28 DIAGNOSIS — K13.79 OTHER LESIONS OF ORAL MUCOSA: ICD-10-CM

## 2022-02-28 DIAGNOSIS — M26.609 UNSPECIFIED TEMPOROMANDIBULAR JOINT DISORDER: ICD-10-CM

## 2022-02-28 PROCEDURE — 99214 OFFICE O/P EST MOD 30 MIN: CPT

## 2022-02-28 PROCEDURE — 99215 OFFICE O/P EST HI 40 MIN: CPT | Mod: 25

## 2022-02-28 PROCEDURE — 31575 DIAGNOSTIC LARYNGOSCOPY: CPT

## 2022-02-28 NOTE — ASSESSMENT
[FreeTextEntry1] : Doing very well with CPAP, excellent compliance and efficacy.  Benefiting from usage. \par Discussed other options- told Inspire is a surgical procedure, not interested.  \par \par Given long term advice about CPAP use.  Call durable medical equipment provider if any equipment problems.  Replace interface as per schedule, generally at least every 3 months.  Stressed importance of CPAP compliance.  Return to see me in about 12 months if doing well. \par \par Time spent today includes download and review of CPAP compliance and efficacy report

## 2022-02-28 NOTE — PHYSICAL EXAM
[General Appearance - Well Developed] : well developed [Normal Appearance] : normal appearance [Well Groomed] : well groomed [General Appearance - Well Nourished] : well nourished [No Deformities] : no deformities [General Appearance - In No Acute Distress] : no acute distress [IV] : IV [Low Lying Soft Palate] : low lying soft palate

## 2022-02-28 NOTE — HISTORY OF PRESENT ILLNESS
[FreeTextEntry1] : 10/28/2020 :  MADDY STARR is a 54 year  female with PMHx GERD, who is referred  by Dr. Nelson. She c/o snoring, witnessed apnea, morning HA, and being tired during the day.\par \par She underwent unattended home study in 7/2020 which showed: severe PITER with AHI 36.6 with chadwick oxygen saturation of 74%. Less than 3% of the study time spend below an oxygen saturation of 88%.\par \par Sleep Routine:\par \par She goes to bed at 12A, sleep latency is about 10-15 min, she wakes up 2-4x, WASO is 30 min, and then she is up at 8A. She does not nap . Hurricane sleepiness scale (out of 24 points) = 12\par \par She denies cataplexy, RLS, parasomnia, or any other sleep behavioral issues\par \par 2/28/22: On CPAP for severe obstructive sleep apnea since early 2021. Download today shows usage 26/30d, Apnea Hypopnea Index on rx = 1.1, P 95 11.3, Resmed A-10 machine 6-20 cwp.   Feels good with this, but asks about other options.  excessive daytime somnolence resolved\par \par \par

## 2022-03-01 ENCOUNTER — FORM ENCOUNTER (OUTPATIENT)
Age: 57
End: 2022-03-01

## 2022-03-03 PROBLEM — R09.81 NASAL CONGESTION: Status: ACTIVE | Noted: 2020-03-13

## 2022-03-03 PROBLEM — M72.2 BILATERAL PLANTAR FASCIITIS: Status: ACTIVE | Noted: 2017-07-31

## 2022-03-03 PROBLEM — K21.9 GERD (GASTROESOPHAGEAL REFLUX DISEASE): Status: ACTIVE | Noted: 2020-10-28

## 2022-03-03 PROBLEM — K21.9 CHRONIC GERD: Status: ACTIVE | Noted: 2020-02-26

## 2022-03-03 PROBLEM — R49.0 DYSPHONIA: Status: ACTIVE | Noted: 2020-03-13

## 2022-03-03 PROBLEM — R05.3 CHRONIC COUGH: Status: ACTIVE | Noted: 2020-02-26

## 2022-03-03 PROBLEM — K13.79 ELONGATED UVULA, ACQUIRED: Status: ACTIVE | Noted: 2021-01-22

## 2022-03-03 PROBLEM — M26.609 TMJ (TEMPOROMANDIBULAR JOINT DISORDER): Status: ACTIVE | Noted: 2021-01-22

## 2022-03-03 PROBLEM — K21.9 LARYNGOPHARYNGEAL REFLUX (LPR): Status: ACTIVE | Noted: 2021-07-27

## 2022-03-03 NOTE — PHYSICAL EXAM
[de-identified] : thick neck with increased circumference [de-identified] : + crepitus on right [Midline] : trachea located in midline position [Normal] : mucosa is normal [Laryngoscopy Performed] : laryngoscopy was performed, see procedure section for findings [de-identified] : + elongated uvula

## 2022-03-03 NOTE — ASSESSMENT
[FreeTextEntry1] : 56F who presents with multiple ENT issues. Hx and exam are consistent with LPR, severe PITER, and TMJ. Again I discussed that these three things are very much related. In terms of chronic cough, this has improved since the last visit.  I am recommending continued treatment and should there be further improvement we can consider weaning from medication.   .  We also again discussed GI consultation, pending. We again discussed the need for weight loss as her neck circumference can contribute to both PITER and LPR. She is using CPAP\par \par We also discussed voice hygiene, cough avoidance and sips of water throughout the day. Informational sheet given to patient.  She is working with SLP and notes improvement in voice.  She will continue to work with them.\par \par Dentistry noted no TMJ, but that a tooth was causing the facial pain.  they are planning removal.\par \par \par Plan:\par - GI referral\par - If found not to have reflux, will treat for neurogenic cough\par - dental fu\par - cont LPR plan, if further improvement, wean from medication\par - f/u in 3 months

## 2022-03-03 NOTE — PROCEDURE
[de-identified] : Laryngoscopy: Pre-operative Diagnosis: chronic cough, throat discomfort\par Post-operative Diagnosis: LPR, pharyngeal fullness and weakness \par Anesthesia: Topical - 1 % Lidocaine/Phenylephrine\par Procedure: Flexible Laryngoscopy\par \par Procedure Details: \par The patient was placed in the sitting position. After decongestant and anesthesia were applied the laryngoscope was passed. The nasal cavities, nasopharynx, oropharynx, hypopharynx, and larynx were all examined. Vocal folds were examined during respiration and phonation. The following findings were noted:\par \par Findings: \par Nose: Septum is midline, turbinates are normal, nasal airways patent, mucosa normal\par Nasopharynx: Adenoids normal, no masses, eustachian tube normal\par Oropharynx: Pharyngeal walls symmetric and without lesion. Tonsils/fossae symmetric\par Hypopharynx: Hypopharynx and pyriform sinuses without lesion. No masses or asymmetry. No pooling of secretions, crowding of the oropharynx and hypopharynx.\par Larynx: Epiglottis and aryepiglottic folds were sharp and crisp bilaterally. Bilateral false vocal folds normal appearance. Airway was widely patent. + erythema and edema of vocal process and post cricoid region\par \par Strobe Exam Ratings\par 		\par TVF Appearance: +erythema and edema of vocal process and post cricoid region.\par TVF Mobility: normal\par Edema/hypertrophy: +erythema and edema of vocal process and post cricoid region.

## 2022-03-03 NOTE — HISTORY OF PRESENT ILLNESS
[de-identified] : 54F who presents with chronic post nasal drip and cough. Pt notes that she has had a dry cough for over 20 years. She notes that she feels an irritation in her throat and a sensation that she needs to clear her throat. She associates this with a burning sensation. Sometimes it is a throat clear and sometimes a "coughing fit." This occurs 3-4 times daily. It is dry in nature. There are no temporal factors. She feels that post nasal drip as played a role in the past. She was seen by Dr. Mckeon for cough and breathing difficulty. Notes indicate normal PFTs. \par \par In terms of throat symptoms she does have worsening dysphonia over the past several months. Her voice is hoarse and gravely. She denies any dysphagia of solids or liquids, odynophagia. She admits to drinking green tea, eating tomato based products, citrus and blueberries weekly, but denies frequent alcohol consumption, coffee or frequent chocolate.\par \par Patient reports she has a history of sinusitis s/p FESS in 1995, but since has had continued nasal congestion, nasal drainage, facial pressure w/ headaches. She denies anosmia or h/o allergies. \par \par Patient also admits to right ear pain that has been there for 5+ years. She has been prescribed different types of drops for it without any relief. She denies any otorrhea, hearing changes tinnitus, current dizziness (has had vertigo in the past). \par \par No other ENT complaints. No pertinent SH/FH.\par -\par 6/12/20-\par Since the last visit the patient reports she has been strictly adhering to the diet modifications, but admits has not been taking the famotidine as she is trying not to avoid taking medications. She admits to improve in terms of her voice, but continued symptoms of, cough, throat clearing, otalgia. She believes that her nasal symptoms have cleared up.\par \par Additionally today we did discuss her sleep and noted that she has a hx of PITER, but does not use CPAP. Can feel tired throughout the day. Wakes up in the middle of the night frequently.\par - \par Interval History: -\par Update 7/17/20\par Overall symptoms have remained unchanged. Since the last visit she has not been taking the anti reflux medications. She has not been fully following the low acid diet. She states that she is changing to a plant based diet in the upcoming weeks. She perceived some continued dysphonia and says people think she sounds manly on the phone.\par \par She did have an audio/tymp. which showed normal hearing bilaterally and type A tymp bilaterally. Please see medical record for full report. Additionally she did have a polysomnogram and this was consistent with Severe PITER. She will be following up with sleep medicine for CPAP. \par -\par Update 10/23/20\par Patient is stable overall. She reports mild improvement of her chronic cough, but denies any changes to voice, burning sensation of the throat and stomach. Overall 20% improvement in symptoms. She has improved her diet slightly, and she has been intermittent with the anti-reflux medications. She did undergo endoscopy with GI and he agree with reflux changes. She has not follow up with Sleep medicine yet nor started CPAP. She has not followed up with dentistry regrading TMJ \par - \par Interval History: Update 1/22/21\par Patient is stable overall. Throat issues have essentially remained unchanged. She will intermittently still feel the need to cough or clear her throat. She has improved her diet slightly, however she has not used the anti-reflux medications. Otherwise no issues, chewing, eating, swallowing, or breathing. Still notes a deep full voice.\par \par Still has not seen a TMJ specialist. Still waiting on referral from PCP. She did receive her CPAP for severe PITER and has been using this month with a significant reduction in AHI. \par -\par Update 7/27/21\par Pt is overall stable and well.  She uses CPAP nightly for her PITER and doing well with that.  She also admits to voice improvement though still feels that this is deep in nature.   Patient returns with continued left facial discomfort and has not been seen by TMJ specialist/OMFS.  Still with dry intermittent cough, but has not tried anti reflux medications, though she has changed her diet somewhat.  \par -\par Update 11/29/21\par Pt is overall stable and well.  She uses CPAP nightly for her PITER and doing well with that.  She also admits to voice improvement since working with SLP.  In terms of facial discomfort she was sen by TMJ specialist/OMFS.  They advised she does not have TMJ, but a tooth is causing the pain and they plan to remove soon.  \par \par Still with dry intermittent cough but now only occurring when not taking PPI.  Still has not tried H2 blocker.  When she does take the medication she is asymptomatic.  She has continued to changed her diet somewhat.  She is requesting GI consultation (which we provided at last visit, but she did not schedule)\par -\par  [FreeTextEntry1] : Update 2/28/22\par Pt is overall stable and well.  She continues to uses CPAP nightly for her PITER and doing well with that, brings AHI down to normal range.  In terms of facial discomfort she was sen by TMJ specialist/OMFS.  They advised she does not have TMJ, but a tooth is causing the pain and they plan to remove soon.  \par \par Still with dry intermittent cough but now only occurring when not taking PPI.  Still has tried H2 blocker.  She has continued to changed her diet somewhat.  Her symptoms have cough have improved significantly though still present at time.\par

## 2022-04-11 PROBLEM — J04.0 REFLUX LARYNGITIS: Status: ACTIVE | Noted: 2020-03-13

## 2022-08-29 ENCOUNTER — APPOINTMENT (OUTPATIENT)
Dept: OTOLARYNGOLOGY | Facility: CLINIC | Age: 57
End: 2022-08-29

## 2023-04-12 ENCOUNTER — APPOINTMENT (OUTPATIENT)
Dept: PULMONOLOGY | Facility: CLINIC | Age: 58
End: 2023-04-12
Payer: COMMERCIAL

## 2023-04-12 VITALS
WEIGHT: 236 LBS | DIASTOLIC BLOOD PRESSURE: 87 MMHG | TEMPERATURE: 97.8 F | SYSTOLIC BLOOD PRESSURE: 145 MMHG | HEIGHT: 64 IN | HEART RATE: 84 BPM | OXYGEN SATURATION: 97 % | BODY MASS INDEX: 40.29 KG/M2

## 2023-04-12 PROCEDURE — 99213 OFFICE O/P EST LOW 20 MIN: CPT

## 2023-04-12 NOTE — PHYSICAL EXAM
[General Appearance - Well Developed] : well developed [Normal Appearance] : normal appearance [Well Groomed] : well groomed [General Appearance - Well Nourished] : well nourished [No Deformities] : no deformities [General Appearance - In No Acute Distress] : no acute distress [Low Lying Soft Palate] : low lying soft palate [IV] : IV [Heart Rate And Rhythm] : heart rate was normal and rhythm regular [Heart Sounds] : normal S1 and S2 [Heart Sounds Gallop] : no gallops [Murmurs] : no murmurs [Heart Sounds Pericardial Friction Rub] : no pericardial rub [] : no respiratory distress [Auscultation Breath Sounds / Voice Sounds] : lungs were clear to auscultation bilaterally

## 2023-04-12 NOTE — HISTORY OF PRESENT ILLNESS
[FreeTextEntry1] : 10/28/2020 :  MADDY STARR is a 54 year  female with PMHx GERD, who is referred  by Dr. Nelson. She c/o snoring, witnessed apnea, morning HA, and being tired during the day.\par \par She underwent unattended home study in 7/2020 which showed: severe PITER with AHI 36.6 with chadwick oxygen saturation of 74%. Less than 3% of the study time spend below an oxygen saturation of 88%.\par \par Sleep Routine:\par \par She goes to bed at 12A, sleep latency is about 10-15 min, she wakes up 2-4x, WASO is 30 min, and then she is up at 8A. She does not nap . Golden sleepiness scale (out of 24 points) = 12\par \par She denies cataplexy, RLS, parasomnia, or any other sleep behavioral issues\par \par 2/28/22: On CPAP for severe obstructive sleep apnea since early 2021. Download today shows usage 26/30d, Apnea Hypopnea Index on rx = 1.1, P 95 11.3, Resmed A-10 machine 6-20 cwp.   Feels good with this, but asks about other options.  excessive daytime somnolence resolved\par \par \par 4/12/23:  Comfortable with CPAP. using about 75% of nights past month on download (gets in the way of her hair curlers), does not notice any worse when doesn't use.  Small increase weight.  Download today: leak ok, Apnea Hypopnea Index on rx = 2.1\par \par

## 2023-04-12 NOTE — ASSESSMENT
[FreeTextEntry1] : Doing very well with CPAP, fair compliance and good efficacy.  Benefiting from usage.\par \par Given long term advice about CPAP use.  Call durable medical equipment provider if any equipment problems.  Replace interface as per schedule, generally at least every 3 months.  Stressed importance of CPAP compliance.  Return to see me in about 12 months if doing well. \par \par Time spent today includes download and review of CPAP compliance and efficacy report